# Patient Record
Sex: MALE | Race: OTHER | Employment: FULL TIME | URBAN - METROPOLITAN AREA
[De-identification: names, ages, dates, MRNs, and addresses within clinical notes are randomized per-mention and may not be internally consistent; named-entity substitution may affect disease eponyms.]

---

## 2018-01-22 ENCOUNTER — HOSPITAL ENCOUNTER (EMERGENCY)
Facility: HOSPITAL | Age: 53
Discharge: HOME | End: 2018-01-22
Attending: EMERGENCY MEDICINE
Payer: COMMERCIAL

## 2018-01-22 ENCOUNTER — APPOINTMENT (OUTPATIENT)
Dept: CT IMAGING | Facility: HOSPITAL | Age: 53
End: 2018-01-22
Attending: EMERGENCY MEDICINE
Payer: COMMERCIAL

## 2018-01-22 VITALS
TEMPERATURE: 98 F | SYSTOLIC BLOOD PRESSURE: 130 MMHG | HEART RATE: 73 BPM | BODY MASS INDEX: 29.12 KG/M2 | OXYGEN SATURATION: 98 % | WEIGHT: 215 LBS | RESPIRATION RATE: 18 BRPM | DIASTOLIC BLOOD PRESSURE: 77 MMHG | HEIGHT: 72 IN

## 2018-01-22 DIAGNOSIS — S06.0X0A CEREBRAL CONCUSSION, WITHOUT LOSS OF CONSCIOUSNESS, INITIAL ENCOUNTER: Primary | ICD-10-CM

## 2018-01-22 PROCEDURE — 99284 EMERGENCY DEPT VISIT MOD MDM: CPT

## 2018-01-22 PROCEDURE — 70450 CT HEAD/BRAIN W/O DYE: CPT | Performed by: EMERGENCY MEDICINE
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
## 2021-10-25 ENCOUNTER — HOSPITAL ENCOUNTER (INPATIENT)
Facility: HOSPITAL | Age: 56
LOS: 4 days | Discharge: HOME | DRG: 373 | End: 2021-10-29
Attending: EMERGENCY MEDICINE | Admitting: HOSPITALIST
Payer: COMMERCIAL

## 2021-10-25 ENCOUNTER — APPOINTMENT (OUTPATIENT)
Dept: CT IMAGING | Facility: HOSPITAL | Age: 56
DRG: 373 | End: 2021-10-25
Attending: EMERGENCY MEDICINE
Payer: COMMERCIAL

## 2021-10-25 DIAGNOSIS — K35.32 ACUTE APPENDICITIS WITH PERFORATION AND LOCALIZED PERITONITIS, WITHOUT ABSCESS, UNSPECIFIED WHETHER GANGRENE PRESENT: Primary | ICD-10-CM

## 2021-10-25 PROCEDURE — 74177 CT ABD & PELVIS W/CONTRAST: CPT | Performed by: EMERGENCY MEDICINE

## 2021-10-25 PROCEDURE — 99223 1ST HOSP IP/OBS HIGH 75: CPT | Performed by: INTERNAL MEDICINE

## 2021-10-25 RX ORDER — KETOROLAC TROMETHAMINE 15 MG/ML
15 INJECTION, SOLUTION INTRAMUSCULAR; INTRAVENOUS ONCE
Status: COMPLETED | OUTPATIENT
Start: 2021-10-25 | End: 2021-10-25

## 2021-10-25 RX ORDER — MORPHINE SULFATE 2 MG/ML
2 INJECTION, SOLUTION INTRAMUSCULAR; INTRAVENOUS EVERY 2 HOUR PRN
Status: DISCONTINUED | OUTPATIENT
Start: 2021-10-25 | End: 2021-10-26

## 2021-10-25 RX ORDER — ACETAMINOPHEN 325 MG/1
650 TABLET ORAL EVERY 6 HOURS PRN
Status: DISCONTINUED | OUTPATIENT
Start: 2021-10-25 | End: 2021-10-29

## 2021-10-25 RX ORDER — MORPHINE SULFATE 4 MG/ML
4 INJECTION, SOLUTION INTRAMUSCULAR; INTRAVENOUS EVERY 2 HOUR PRN
Status: DISCONTINUED | OUTPATIENT
Start: 2021-10-25 | End: 2021-10-26

## 2021-10-25 RX ORDER — SODIUM CHLORIDE 9 MG/ML
INJECTION, SOLUTION INTRAVENOUS CONTINUOUS
Status: DISCONTINUED | OUTPATIENT
Start: 2021-10-25 | End: 2021-10-29

## 2021-10-25 RX ORDER — MORPHINE SULFATE 2 MG/ML
1 INJECTION, SOLUTION INTRAMUSCULAR; INTRAVENOUS EVERY 2 HOUR PRN
Status: DISCONTINUED | OUTPATIENT
Start: 2021-10-25 | End: 2021-10-26

## 2021-10-25 RX ORDER — MORPHINE SULFATE 4 MG/ML
4 INJECTION, SOLUTION INTRAMUSCULAR; INTRAVENOUS ONCE
Status: COMPLETED | OUTPATIENT
Start: 2021-10-25 | End: 2021-10-25

## 2021-10-26 PROCEDURE — 99254 IP/OBS CNSLTJ NEW/EST MOD 60: CPT | Performed by: SURGERY

## 2021-10-26 PROCEDURE — 99233 SBSQ HOSP IP/OBS HIGH 50: CPT | Performed by: HOSPITALIST

## 2021-10-26 RX ORDER — ONDANSETRON 2 MG/ML
4 INJECTION INTRAMUSCULAR; INTRAVENOUS EVERY 6 HOURS PRN
Status: DISCONTINUED | OUTPATIENT
Start: 2021-10-26 | End: 2021-10-29

## 2021-10-26 RX ORDER — HYDROMORPHONE HYDROCHLORIDE 1 MG/ML
0.2 INJECTION, SOLUTION INTRAMUSCULAR; INTRAVENOUS; SUBCUTANEOUS EVERY 2 HOUR PRN
Status: DISCONTINUED | OUTPATIENT
Start: 2021-10-26 | End: 2021-10-29

## 2021-10-26 RX ORDER — HYDROMORPHONE HYDROCHLORIDE 1 MG/ML
0.4 INJECTION, SOLUTION INTRAMUSCULAR; INTRAVENOUS; SUBCUTANEOUS EVERY 2 HOUR PRN
Status: DISCONTINUED | OUTPATIENT
Start: 2021-10-26 | End: 2021-10-29

## 2021-10-26 RX ORDER — HYDROMORPHONE HYDROCHLORIDE 1 MG/ML
0.8 INJECTION, SOLUTION INTRAMUSCULAR; INTRAVENOUS; SUBCUTANEOUS EVERY 2 HOUR PRN
Status: DISCONTINUED | OUTPATIENT
Start: 2021-10-26 | End: 2021-10-29

## 2021-10-26 RX ORDER — ONDANSETRON 2 MG/ML
INJECTION INTRAMUSCULAR; INTRAVENOUS
Status: COMPLETED
Start: 2021-10-26 | End: 2021-10-26

## 2021-10-26 NOTE — PROGRESS NOTES
[facility] - [facility]  Progress Note     Amadou Gutierrez  : 1965    Status: Inpatient  Day #: 1    Attending: Danielle Cortes MD  PCP: No primary care provider on file.       Assessment and Plan     Acute appendicitis with perforation and localized perit appendicitis with rupture. There is a dilated appendix with moderate inflammatory reaction about the appendix and a mild-to-moderate amount of surrounding fluid. No large abscess or large free air collection noted. Surgical consultation is needed.   No

## 2021-10-26 NOTE — CONSULTS
[facility] [address] Patient Status:  Inpatient    1965 MRN [id number]   Location [facility] 4W/SW/SE Attending Colt Palmer MD   Hosp Day # 1 PCP No primary care provider on file.      Date:  10/26/2021  D No swelling noted. Integument: No lesions. No erythema. Psychiatric: Appropriate mood and affect.         Results:    Lab Results   Component Value Date    WBC 16.4 (H) 10/26/2021    HGB 13.0 10/26/2021    HCT 40.7 10/26/2021    .0 10/26/2021    CR Resident      Addendum:    Pt seen and examined. I agree with Dr. Petar Baker' note. Perforated appendicitis. Plan for non operative management. Reimaging in 2 days if pt has persistent leukocytosis.         Stefania Kinney MD

## 2021-10-26 NOTE — H&P
27 [facility] Patient Status:  Emergency    1965 MRN [id number]   Location [address] [place] [address] Attending Quoc Fontaine MD   Hosp Day # 0 PCP No primary care provider on file. pain  Genitourinary:negative  Hematologic/lymphatic: negative  Musculoskeletal:negative  Neurological: negative  Behavioral/Psych: negative    Physical Exam:   Vital Signs:  Blood pressure 129/74, pulse 67, temperature 98.1 °F (36.7 °C), temperature source Diverticulosis of the descending and more significantly involving the sigmoid colon without definite acute diverticulitis changes. 4. Results were called by Dr. Thierno Eagle to Dr. Willard Rucker in the emergency room at approximately 2155 hours on 10/25/2021. Guadalupe Riedel     Dict

## 2021-10-26 NOTE — ED QUICK NOTES
Orders for admission, patient is aware of plan and ready to go upstairs.  Any questions, please call ED GINA Carroll  at extension [phone]  Type of COVID test sent:  Rapid Pending    Titratable drug(s) infusing:  Rate:  Zosyn given    LOC at time of transport:

## 2021-10-26 NOTE — PAYOR COMM NOTE
--------------  ADMISSION REVIEW     Payor: [facility] PPO  Subscriber #:  [id number]  Authorization Number: [id number]    Admit date: 10/25/21  Admit time: 11:34 PM       REVIEW DOCUMENTATION:     ED Provider Notes      ED Provider Notes sign 98%   BMI 32.55 kg/m²         Physical Exam      General Appearance: alert, no distress  Eyes: pupils equal and round no pallor or injection  ENT, Mouth: mucous membranes moist  Respiratory: there are no retractions, lungs are clear to auscultation  Cardio DIFFERENTIAL[[id number]]          Abnormal            Final result                 Please view results for these tests on the individual orders.    RAINBOW DRAW LAVENDER   RAINBOW DRAW LIGHT GREEN   RAPID SARS-COV-2 BY PCR   ED/MRSA SCREEN BY PCR-CC Pain      HPI:   Aldair Don is a(n) 60year old male. Pleasant 43-year-old male with significant past medical history of diverticulosis. Not currently on any medications. Does not have a primary care provider.   Has not been to a physician in many years benign.   Pulmonary:  clear to auscultation bilaterally  Chest wall: no tenderness  Cardiovascular: S1, S2 normal, no murmur, click, rub or gallop, regular rate and rhythm  Abdominal: abnormal findings:   rebound tenderness and Pain RLQ  Extremities: extrem peritonitis, without abscess  - General surgery consulted  - Cont IV zosyn  - NPO  - 0.9 IVFs    diet: NPO  ivf: IVFs  dvt prophylaxis: Hold while awaiting surgery recs  antibiotics: Zosyn  tubes: none  central lines: none  code status: full code  dispo: h Action Dose Route User    10/25/2021 9428 New Bag 1,000 mL Intravenous Valerie Kaur, GINA          Vitals (last day)     Date/Time Temp Pulse Resp BP SpO2 Weight O2 Device O2 Flow Rate (L/min) [facility]    10/26/21 0321 98.6 °F (37 °C) 72 22 144/78 99 % — — —

## 2021-10-26 NOTE — PROGRESS NOTES
[facility] Pharmacy Note: Antimicrobial Weight Based Dose Adjustment for: piperacillin/tazobactam (Mila Campbell)    Keyla Pal is a 60year old patient who has been prescribed piperacillin/tazobactam (ZOSYN) 3.375 gm x1 in ER.     Estimated Creatinine Clearance: 102.8 m

## 2021-10-26 NOTE — ED INITIAL ASSESSMENT (HPI)
Pt states having abd pain, that is just like his diverticulitis. States constipated. Denies nausea/vomining/diarrhea.

## 2021-10-26 NOTE — ED PROVIDER NOTES
Patient Seen in: [facility] AND [facility] Emergency Department      History   Patient presents with:  Abdomen/Flank Pain    Stated Complaint: abdominal pain    Subjective:   HPI    24-year-old male with history of diverticulitis presents with complaints of abdo lower quadrants, no masses, bowel sounds normal  Neurological: Speech normal.  Moving extremities equally x4. Skin: warm and dry, no rashes.   Musculoskeletal: neck is supple non tender        Extremities are symmetrical, full range of motion  Psychiatric: Chasidy, general surgery.   Admission disposition: 10/25/2021 10:30 PM                                  Disposition and Plan     Clinical Impression:  Acute appendicitis with perforation and localized peritonitis, without abscess, unspecified whether gangrene p

## 2021-10-26 NOTE — PLAN OF CARE
Rosa Maria Ingram is A&Ox4. Pain managed with morphine. PRN zofran for nausea. IV Zosyn for abx. NPO. IVF initiated. Resting comfortable at this time.     Problem: Patient Centered Care  Goal: Patient preferences are identified and integrated in the patient's plan of c appropriate  Outcome: Progressing     Problem: RISK FOR INFECTION - ADULT  Goal: Absence of fever/infection during anticipated neutropenic period  Description: INTERVENTIONS  - Monitor WBC  - Administer growth factors as ordered  - Implement neutropenic gu

## 2021-10-27 PROCEDURE — 99233 SBSQ HOSP IP/OBS HIGH 50: CPT | Performed by: HOSPITALIST

## 2021-10-27 PROCEDURE — 99232 SBSQ HOSP IP/OBS MODERATE 35: CPT | Performed by: SURGERY

## 2021-10-27 RX ORDER — HYDROCODONE BITARTRATE AND ACETAMINOPHEN 5; 325 MG/1; MG/1
1 TABLET ORAL EVERY 6 HOURS PRN
Status: DISCONTINUED | OUTPATIENT
Start: 2021-10-27 | End: 2021-10-29

## 2021-10-27 RX ORDER — HYDROCODONE BITARTRATE AND ACETAMINOPHEN 5; 325 MG/1; MG/1
2 TABLET ORAL EVERY 6 HOURS PRN
Status: DISCONTINUED | OUTPATIENT
Start: 2021-10-27 | End: 2021-10-29

## 2021-10-27 NOTE — PROGRESS NOTES
[place] [facility]    Progress Note    Haley Huerta Patient Status:  Inpatient    1965 MRN [id number]   Location [facility] 4W/SW/SE Attending Shira Hankins MD   Hosp Day # 2 PCP No primary care provider on file.      Assessment & Plan: Date: 10/25/2021  CONCLUSION:  1. Findings are compatible with acute appendicitis with rupture. There is a dilated appendix with moderate inflammatory reaction about the appendix and a mild-to-moderate amount of surrounding fluid.   No large abscess or lar

## 2021-10-27 NOTE — PLAN OF CARE
Continue IV ABX , pt on clear liquid diet, medicated for pain with Norco with good results, pt up sitting in chair and walking in room.   Problem: Patient Centered Care  Goal: Patient preferences are identified and integrated in the patient's plan of care

## 2021-10-27 NOTE — PROGRESS NOTES
[place] [facility] - [facility]    Progress Note    Beatriz Cash Patient Status:  Inpatient    1965 MRN [id number]   Location [facility] 4W/SW/SE Attending Yu Barillas,  [facility] Day # 2 PCP No primary care provider on file.        Subjective: Finalized by (CST): Dane Johnson MD on 10/25/2021 at 10:06 PM                Assessment and Plan:     Acute appendicitis with perforation and localized peritonitis without abscess  Leukocytosis  - general surgery on consult  - CT A/P reviewed - no flui

## 2021-10-27 NOTE — PLAN OF CARE
Pt is A/Ox4, on room air, tolerating clear liquid diet. No BM, voiding freely. Reported nausea x1 that was managed with Zofran, pain throughout shift managed with Dilaudid PRN per [facility]. IVF and antibiotics infusing. Will continue to monitor.      Problem: Pa unsuccessful or patient reports new pain  - Anticipate increased pain with activity and pre-medicate as appropriate  Outcome: Progressing     Problem: RISK FOR INFECTION - ADULT  Goal: Absence of fever/infection during anticipated neutropenic period  Descr

## 2021-10-28 ENCOUNTER — APPOINTMENT (OUTPATIENT)
Dept: CT IMAGING | Facility: HOSPITAL | Age: 56
DRG: 373 | End: 2021-10-28
Attending: SURGERY
Payer: COMMERCIAL

## 2021-10-28 PROCEDURE — 74177 CT ABD & PELVIS W/CONTRAST: CPT | Performed by: SURGERY

## 2021-10-28 PROCEDURE — 99233 SBSQ HOSP IP/OBS HIGH 50: CPT | Performed by: HOSPITALIST

## 2021-10-28 NOTE — PROGRESS NOTES
[place] [facility]    Progress Note    [facility] Patient Status:  Inpatient    1965 MRN [id number]   Location [facility] 4W/SW/SE Attending Jeanine Pérez, [facility] Day # 3 PCP No primary care provider on file.        Subjective:

## 2021-10-28 NOTE — PROGRESS NOTES
[facility]    Progress Note    Davion Saeed Patient Status:  Inpatient    1965 MRN [id number]   Location [facility] 4W/SW/SE Attending Brenda Cano,  [facility] Day # 2 PCP No primary care provider on file.      Assessment and ALKPHO 89 10/26/2021    BILT 0.6 10/26/2021    TP 7.4 10/26/2021    AST 7 (L) 10/26/2021    ALT 18 10/26/2021    LIP 79 10/25/2021       No results found.             Freya Claudio MD  10/27/2021

## 2021-10-28 NOTE — PROGRESS NOTES
[place] [facility] - [facility]    Progress Note    Tildon Left Patient Status:  Inpatient    1965 MRN [id number]   Location [facility] 4W/SW/SE Attending Ava Gordon MD   Hosp Day # 3 PCP No primary care provider on file.      Assessment & Plan:

## 2021-10-28 NOTE — PLAN OF CARE
Problem: Patient Centered Care  Goal: Patient preferences are identified and integrated in the patient's plan of care  Description: Interventions:  - What would you like us to know as we care for you?  I live with my spouse  - Provide timely, complete, an period  Description: INTERVENTIONS  - Monitor WBC  - Administer growth factors as ordered  - Implement neutropenic guidelines  Outcome: Progressing     Problem: DISCHARGE PLANNING  Goal: Discharge to home or other facility with appropriate resources  Descr

## 2021-10-29 VITALS
OXYGEN SATURATION: 99 % | HEART RATE: 56 BPM | HEIGHT: 72 IN | RESPIRATION RATE: 18 BRPM | BODY MASS INDEX: 34.81 KG/M2 | TEMPERATURE: 98 F | SYSTOLIC BLOOD PRESSURE: 123 MMHG | WEIGHT: 257 LBS | DIASTOLIC BLOOD PRESSURE: 66 MMHG

## 2021-10-29 PROCEDURE — 99239 HOSP IP/OBS DSCHRG MGMT >30: CPT | Performed by: HOSPITALIST

## 2021-10-29 RX ORDER — METRONIDAZOLE 500 MG/1
500 TABLET ORAL 3 TIMES DAILY
Qty: 42 TABLET | Refills: 0 | Status: SHIPPED | OUTPATIENT
Start: 2021-10-29 | End: 2021-11-12

## 2021-10-29 RX ORDER — LEVOFLOXACIN 500 MG/1
500 TABLET, FILM COATED ORAL DAILY
Qty: 14 TABLET | Refills: 0 | Status: SHIPPED | OUTPATIENT
Start: 2021-10-29 | End: 2021-11-12

## 2021-10-29 RX ORDER — HYDROCODONE BITARTRATE AND ACETAMINOPHEN 5; 325 MG/1; MG/1
1-2 TABLET ORAL EVERY 6 HOURS PRN
Qty: 20 TABLET | Refills: 0 | Status: SHIPPED | OUTPATIENT
Start: 2021-10-29

## 2021-10-29 NOTE — PLAN OF CARE
Pt A&Ox4. VSS. States pain, PRN norco given. Denies nausea or vomiting. Burping and passing gas. IV fluids and abx continued. Advanced diet to general. Ambulating independently. Plan for possible discharge with PO abx. Family at bedside.  Safety precautions side effects  - Notify MD/LIP if interventions unsuccessful or patient reports new pain  - Anticipate increased pain with activity and pre-medicate as appropriate  Outcome: Progressing     Problem: RISK FOR INFECTION - ADULT  Goal: Absence of fever/infecti

## 2021-10-29 NOTE — PROGRESS NOTES
[facility]    Progress Note    Aldair Don Patient Status:  Inpatient    1965 MRN [id number]   Location [facility] 4W/SW/SE Attending Andrea Barroso MD   Hosp Day # 4 PCP No primary care provider on file.      Assessment & Plan: ONLY)(CPT=74177)    Result Date: 10/28/2021  CONCLUSION:  1.  When compared to the previous study, there has been progression of a moderate inflammatory reaction in the right lower quadrant about the inflamed appendix with increasing phlegmonous appearing r

## 2021-10-29 NOTE — PLAN OF CARE
Discharge orders received from medical and surgical. Discharge instructions printed and discussed with patient and wife. Follow up appointments discussed. Prescriptions sent to pharmacy. All questions answered. Patient discharged home with no needs.  Lynette analgesics based on type and severity of pain and evaluate response  - Implement non-pharmacological measures as appropriate and evaluate response  - Consider cultural and social influences on pain and pain management  - Manage/alleviate anxiety  - Utilize Allen Alvarez, RN  Outcome: Progressing

## 2021-10-29 NOTE — PROGRESS NOTES
[facility]    Progress Note    Bereket Gil Patient Status:  Inpatient    1965 MRN [id number]   Location [facility] 4W/SW/SE Attending Lawson Hinson,  [facility]  Day # 4 PCP No primary care provider on file.        Subjective: Vicarious excretion of contrast in the gallbladder. Small right pleural effusion and mild right basal atelectasis now noted.     Dictated by (CST): Tara Maria MD on 10/28/2021 at 9:59 PM     Finalized by (CST): Tara Maria MD on 10/28/2021 at 10:09

## 2021-10-30 NOTE — DISCHARGE SUMMARY
[facility]    Discharge Summary    Frutoso Sinks Patient Status:  Inpatient    1965 MRN [id number]   Location [facility] 4W/SW/SE Attending No att. providers found   [facility] Day # 4 PCP No primary care provider on file.      Date acute appendicitis with perforation and localized peritonitis, without abscess. Started on IV Zosyn. General surgery contacted. Patient was administered IV morphine with minimal relief. Rates the pain as a 10 out of 10.     Hospital Course:     Acute ap · HYDROcodone-acetaminophen 5-325 MG Tabs  · levoFLOXacin 500 MG Tabs  · metRONIDAZOLE 500 MG Tabs         Follow up Visits:      Follow-up Information     Mayra Hawkins MD.    Specialty: SURGERY, GENERAL  Why: As needed  Contact information:  [place]

## 2022-03-07 ENCOUNTER — APPOINTMENT (OUTPATIENT)
Dept: ULTRASOUND IMAGING | Facility: HOSPITAL | Age: 57
End: 2022-03-07
Attending: EMERGENCY MEDICINE
Payer: COMMERCIAL

## 2022-03-07 ENCOUNTER — HOSPITAL ENCOUNTER (EMERGENCY)
Facility: HOSPITAL | Age: 57
Discharge: HOME | End: 2022-03-07
Attending: EMERGENCY MEDICINE
Payer: COMMERCIAL

## 2022-03-07 ENCOUNTER — APPOINTMENT (OUTPATIENT)
Dept: CT IMAGING | Facility: HOSPITAL | Age: 57
End: 2022-03-07
Attending: EMERGENCY MEDICINE
Payer: COMMERCIAL

## 2022-03-07 VITALS
OXYGEN SATURATION: 99 % | DIASTOLIC BLOOD PRESSURE: 94 MMHG | HEIGHT: 72 IN | WEIGHT: 230 LBS | SYSTOLIC BLOOD PRESSURE: 168 MMHG | RESPIRATION RATE: 18 BRPM | TEMPERATURE: 97 F | HEART RATE: 74 BPM | BODY MASS INDEX: 31.15 KG/M2

## 2022-03-07 DIAGNOSIS — R10.9 RIGHT FLANK PAIN: Primary | ICD-10-CM

## 2022-03-07 LAB
ALBUMIN SERPL-MCNC: 3.5 G/DL (ref 3.4–5)
ALP LIVER SERPL-CCNC: 111 U/L
ALT SERPL-CCNC: 52 U/L
ANION GAP SERPL CALC-SCNC: 4 MMOL/L (ref 0–18)
AST SERPL-CCNC: 67 U/L (ref 15–37)
BASOPHILS # BLD AUTO: 0.09 X10(3) UL (ref 0–0.2)
BASOPHILS NFR BLD AUTO: 1 %
BILIRUB DIRECT SERPL-MCNC: 0.2 MG/DL (ref 0–0.2)
BILIRUB SERPL-MCNC: 0.5 MG/DL (ref 0.1–2)
BILIRUB UR QL: NEGATIVE
BUN BLD-MCNC: 17 MG/DL (ref 7–18)
BUN/CREAT SERPL: 16 (ref 10–20)
CALCIUM BLD-MCNC: 8.7 MG/DL (ref 8.5–10.1)
CHLORIDE SERPL-SCNC: 106 MMOL/L (ref 98–112)
CO2 SERPL-SCNC: 28 MMOL/L (ref 21–32)
COLOR UR: YELLOW
CREAT BLD-MCNC: 1.06 MG/DL
DEPRECATED RDW RBC AUTO: 41.4 FL (ref 35.1–46.3)
EOSINOPHIL # BLD AUTO: 0.23 X10(3) UL (ref 0–0.7)
EOSINOPHIL NFR BLD AUTO: 2.5 %
ERYTHROCYTE [DISTWIDTH] IN BLOOD BY AUTOMATED COUNT: 13.5 % (ref 11–15)
GLUCOSE BLD-MCNC: 120 MG/DL (ref 70–99)
GLUCOSE UR-MCNC: NEGATIVE MG/DL
HCT VFR BLD AUTO: 44.3 %
HGB BLD-MCNC: 14.2 G/DL
HGB UR QL STRIP.AUTO: NEGATIVE
IMM GRANULOCYTES # BLD AUTO: 0.04 X10(3) UL (ref 0–1)
IMM GRANULOCYTES NFR BLD: 0.4 %
KETONES UR-MCNC: NEGATIVE MG/DL
LEUKOCYTE ESTERASE UR QL STRIP.AUTO: NEGATIVE
LIPASE SERPL-CCNC: 146 U/L (ref 73–393)
LYMPHOCYTES # BLD AUTO: 2.73 X10(3) UL (ref 1–4)
LYMPHOCYTES NFR BLD AUTO: 29.8 %
MCHC RBC AUTO-ENTMCNC: 32.1 G/DL (ref 31–37)
MCV RBC AUTO: 83.4 FL
MONOCYTES # BLD AUTO: 0.6 X10(3) UL (ref 0.1–1)
MONOCYTES NFR BLD AUTO: 6.6 %
NEUTROPHILS # BLD AUTO: 5.47 X10 (3) UL (ref 1.5–7.7)
NEUTROPHILS # BLD AUTO: 5.47 X10(3) UL (ref 1.5–7.7)
NEUTROPHILS NFR BLD AUTO: 59.7 %
NITRITE UR QL STRIP.AUTO: NEGATIVE
OSMOLALITY SERPL CALC.SUM OF ELEC: 289 MOSM/KG (ref 275–295)
PH UR: 7 [PH] (ref 5–8)
PLATELET # BLD AUTO: 317 10(3)UL (ref 150–450)
POTASSIUM SERPL-SCNC: 3.9 MMOL/L (ref 3.5–5.1)
PROT SERPL-MCNC: 7.2 G/DL (ref 6.4–8.2)
PROT UR-MCNC: NEGATIVE MG/DL
RBC # BLD AUTO: 5.31 X10(6)UL
SODIUM SERPL-SCNC: 138 MMOL/L (ref 136–145)
SP GR UR STRIP: 1.02 (ref 1–1.03)
UROBILINOGEN UR STRIP-ACNC: <2
VIT C UR-MCNC: NEGATIVE MG/DL
WBC # BLD AUTO: 9.2 X10(3) UL (ref 4–11)

## 2022-03-07 PROCEDURE — 85025 COMPLETE CBC W/AUTO DIFF WBC: CPT | Performed by: EMERGENCY MEDICINE

## 2022-03-07 PROCEDURE — 74177 CT ABD & PELVIS W/CONTRAST: CPT | Performed by: EMERGENCY MEDICINE

## 2022-03-07 PROCEDURE — 96374 THER/PROPH/DIAG INJ IV PUSH: CPT

## 2022-03-07 PROCEDURE — 99284 EMERGENCY DEPT VISIT MOD MDM: CPT

## 2022-03-07 PROCEDURE — 81003 URINALYSIS AUTO W/O SCOPE: CPT | Performed by: EMERGENCY MEDICINE

## 2022-03-07 PROCEDURE — 80076 HEPATIC FUNCTION PANEL: CPT | Performed by: EMERGENCY MEDICINE

## 2022-03-07 PROCEDURE — 76705 ECHO EXAM OF ABDOMEN: CPT | Performed by: EMERGENCY MEDICINE

## 2022-03-07 PROCEDURE — 80048 BASIC METABOLIC PNL TOTAL CA: CPT | Performed by: EMERGENCY MEDICINE

## 2022-03-07 PROCEDURE — 96375 TX/PRO/DX INJ NEW DRUG ADDON: CPT

## 2022-03-07 PROCEDURE — 83690 ASSAY OF LIPASE: CPT | Performed by: EMERGENCY MEDICINE

## 2022-03-07 RX ORDER — ONDANSETRON 2 MG/ML
INJECTION INTRAMUSCULAR; INTRAVENOUS
Status: COMPLETED
Start: 2022-03-07 | End: 2022-03-07

## 2022-03-07 RX ORDER — MORPHINE SULFATE 4 MG/ML
4 INJECTION, SOLUTION INTRAMUSCULAR; INTRAVENOUS ONCE
Status: COMPLETED | OUTPATIENT
Start: 2022-03-07 | End: 2022-03-07

## 2022-03-07 RX ORDER — ONDANSETRON 2 MG/ML
4 INJECTION INTRAMUSCULAR; INTRAVENOUS ONCE
Status: COMPLETED | OUTPATIENT
Start: 2022-03-07 | End: 2022-03-07

## 2022-03-07 NOTE — ED INITIAL ASSESSMENT (HPI)
Patient arrives with RUQ pain that woke him up from his sleep. Denies N/V/D, fevers, and constipation.

## 2023-08-24 ENCOUNTER — OFFICE VISIT (OUTPATIENT)
Dept: INTERNAL MEDICINE CLINIC | Facility: CLINIC | Age: 58
End: 2023-08-24

## 2023-08-24 ENCOUNTER — LAB ENCOUNTER (OUTPATIENT)
Dept: LAB | Age: 58
End: 2023-08-24
Attending: INTERNAL MEDICINE
Payer: COMMERCIAL

## 2023-08-24 VITALS
SYSTOLIC BLOOD PRESSURE: 138 MMHG | TEMPERATURE: 98 F | DIASTOLIC BLOOD PRESSURE: 76 MMHG | OXYGEN SATURATION: 97 % | HEIGHT: 72 IN | BODY MASS INDEX: 32.42 KG/M2 | WEIGHT: 239.38 LBS | HEART RATE: 68 BPM

## 2023-08-24 DIAGNOSIS — K57.90 DIVERTICULOSIS: ICD-10-CM

## 2023-08-24 DIAGNOSIS — Z00.00 PHYSICAL EXAM: Primary | ICD-10-CM

## 2023-08-24 DIAGNOSIS — M17.0 PRIMARY OSTEOARTHRITIS OF BOTH KNEES: ICD-10-CM

## 2023-08-24 DIAGNOSIS — Z00.00 PHYSICAL EXAM: ICD-10-CM

## 2023-08-24 LAB
ALBUMIN SERPL-MCNC: 3.6 G/DL (ref 3.4–5)
ALBUMIN/GLOB SERPL: 1 {RATIO} (ref 1–2)
ALP LIVER SERPL-CCNC: 74 U/L
ALT SERPL-CCNC: 28 U/L
ANION GAP SERPL CALC-SCNC: 2 MMOL/L (ref 0–18)
AST SERPL-CCNC: 12 U/L (ref 15–37)
BASOPHILS # BLD AUTO: 0.09 X10(3) UL (ref 0–0.2)
BASOPHILS NFR BLD AUTO: 1.1 %
BILIRUB SERPL-MCNC: 0.4 MG/DL (ref 0.1–2)
BUN BLD-MCNC: 23 MG/DL (ref 7–18)
BUN/CREAT SERPL: 23 (ref 10–20)
CALCIUM BLD-MCNC: 8.7 MG/DL (ref 8.5–10.1)
CHLORIDE SERPL-SCNC: 110 MMOL/L (ref 98–112)
CHOLEST SERPL-MCNC: 193 MG/DL (ref ?–200)
CO2 SERPL-SCNC: 31 MMOL/L (ref 21–32)
COMPLEXED PSA SERPL-MCNC: 2.5 NG/ML (ref ?–4)
CREAT BLD-MCNC: 1 MG/DL
DEPRECATED RDW RBC AUTO: 39.9 FL (ref 35.1–46.3)
EGFRCR SERPLBLD CKD-EPI 2021: 86 ML/MIN/1.73M2 (ref 60–?)
EOSINOPHIL # BLD AUTO: 0.2 X10(3) UL (ref 0–0.7)
EOSINOPHIL NFR BLD AUTO: 2.5 %
ERYTHROCYTE [DISTWIDTH] IN BLOOD BY AUTOMATED COUNT: 12.9 % (ref 11–15)
EST. AVERAGE GLUCOSE BLD GHB EST-MCNC: 120 MG/DL (ref 68–126)
FASTING PATIENT LIPID ANSWER: NO
FASTING STATUS PATIENT QL REPORTED: NO
GLOBULIN PLAS-MCNC: 3.5 G/DL (ref 2.8–4.4)
GLUCOSE BLD-MCNC: 102 MG/DL (ref 70–99)
HBA1C MFR BLD: 5.8 % (ref ?–5.7)
HCT VFR BLD AUTO: 43.8 %
HDLC SERPL-MCNC: 48 MG/DL (ref 40–59)
HGB BLD-MCNC: 14.3 G/DL
IMM GRANULOCYTES # BLD AUTO: 0.02 X10(3) UL (ref 0–1)
IMM GRANULOCYTES NFR BLD: 0.2 %
LDLC SERPL CALC-MCNC: 125 MG/DL (ref ?–100)
LYMPHOCYTES # BLD AUTO: 3.38 X10(3) UL (ref 1–4)
LYMPHOCYTES NFR BLD AUTO: 42 %
MCH RBC QN AUTO: 27.7 PG (ref 26–34)
MCHC RBC AUTO-ENTMCNC: 32.6 G/DL (ref 31–37)
MCV RBC AUTO: 84.7 FL
MONOCYTES # BLD AUTO: 0.68 X10(3) UL (ref 0.1–1)
MONOCYTES NFR BLD AUTO: 8.5 %
NEUTROPHILS # BLD AUTO: 3.67 X10 (3) UL (ref 1.5–7.7)
NEUTROPHILS # BLD AUTO: 3.67 X10(3) UL (ref 1.5–7.7)
NEUTROPHILS NFR BLD AUTO: 45.7 %
NONHDLC SERPL-MCNC: 145 MG/DL (ref ?–130)
OSMOLALITY SERPL CALC.SUM OF ELEC: 300 MOSM/KG (ref 275–295)
PLATELET # BLD AUTO: 309 10(3)UL (ref 150–450)
POTASSIUM SERPL-SCNC: 4.1 MMOL/L (ref 3.5–5.1)
PROT SERPL-MCNC: 7.1 G/DL (ref 6.4–8.2)
RBC # BLD AUTO: 5.17 X10(6)UL
SODIUM SERPL-SCNC: 143 MMOL/L (ref 136–145)
TRIGL SERPL-MCNC: 111 MG/DL (ref 30–149)
TSI SER-ACNC: 2.75 MIU/ML (ref 0.36–3.74)
VLDLC SERPL CALC-MCNC: 20 MG/DL (ref 0–30)
WBC # BLD AUTO: 8 X10(3) UL (ref 4–11)

## 2023-08-24 PROCEDURE — 80053 COMPREHEN METABOLIC PANEL: CPT

## 2023-08-24 PROCEDURE — 85025 COMPLETE CBC W/AUTO DIFF WBC: CPT

## 2023-08-24 PROCEDURE — 3008F BODY MASS INDEX DOCD: CPT | Performed by: INTERNAL MEDICINE

## 2023-08-24 PROCEDURE — 3075F SYST BP GE 130 - 139MM HG: CPT | Performed by: INTERNAL MEDICINE

## 2023-08-24 PROCEDURE — 80061 LIPID PANEL: CPT

## 2023-08-24 PROCEDURE — 83036 HEMOGLOBIN GLYCOSYLATED A1C: CPT

## 2023-08-24 PROCEDURE — 84443 ASSAY THYROID STIM HORMONE: CPT

## 2023-08-24 PROCEDURE — 99396 PREV VISIT EST AGE 40-64: CPT | Performed by: INTERNAL MEDICINE

## 2023-08-24 PROCEDURE — 3078F DIAST BP <80 MM HG: CPT | Performed by: INTERNAL MEDICINE

## 2023-08-24 PROCEDURE — 36415 COLL VENOUS BLD VENIPUNCTURE: CPT

## 2023-08-29 ENCOUNTER — TELEPHONE (OUTPATIENT)
Dept: INTERNAL MEDICINE CLINIC | Facility: CLINIC | Age: 58
End: 2023-08-29

## 2024-01-12 ENCOUNTER — TELEPHONE (OUTPATIENT)
Dept: GASTROENTEROLOGY | Facility: CLINIC | Age: 59
End: 2024-01-12

## 2024-01-12 ENCOUNTER — OFFICE VISIT (OUTPATIENT)
Dept: GASTROENTEROLOGY | Facility: CLINIC | Age: 59
End: 2024-01-12

## 2024-01-12 VITALS
DIASTOLIC BLOOD PRESSURE: 88 MMHG | WEIGHT: 249 LBS | HEIGHT: 72 IN | HEART RATE: 60 BPM | BODY MASS INDEX: 33.72 KG/M2 | SYSTOLIC BLOOD PRESSURE: 151 MMHG

## 2024-01-12 DIAGNOSIS — Z12.11 ENCOUNTER FOR SCREENING COLONOSCOPY: ICD-10-CM

## 2024-01-12 DIAGNOSIS — Z87.19 HISTORY OF COLONIC DIVERTICULITIS: Primary | ICD-10-CM

## 2024-01-12 DIAGNOSIS — R93.3 ABNORMAL CT SCAN, COLON: ICD-10-CM

## 2024-01-12 PROCEDURE — 3077F SYST BP >= 140 MM HG: CPT | Performed by: INTERNAL MEDICINE

## 2024-01-12 PROCEDURE — 99242 OFF/OP CONSLTJ NEW/EST SF 20: CPT | Performed by: INTERNAL MEDICINE

## 2024-01-12 PROCEDURE — 3079F DIAST BP 80-89 MM HG: CPT | Performed by: INTERNAL MEDICINE

## 2024-01-12 PROCEDURE — 3008F BODY MASS INDEX DOCD: CPT | Performed by: INTERNAL MEDICINE

## 2024-01-12 RX ORDER — POLYETHYLENE GLYCOL 3350, SODIUM SULFATE ANHYDROUS, SODIUM BICARBONATE, SODIUM CHLORIDE, POTASSIUM CHLORIDE 236; 22.74; 6.74; 5.86; 2.97 G/4L; G/4L; G/4L; G/4L; G/4L
4 POWDER, FOR SOLUTION ORAL ONCE
Qty: 1 EACH | Refills: 0 | Status: SHIPPED | OUTPATIENT
Start: 2024-01-12 | End: 2024-01-12

## 2024-01-12 RX ORDER — POLYETHYLENE GLYCOL 3350, SODIUM SULFATE ANHYDROUS, SODIUM BICARBONATE, SODIUM CHLORIDE, POTASSIUM CHLORIDE 236; 22.74; 6.74; 5.86; 2.97 G/4L; G/4L; G/4L; G/4L; G/4L
4 POWDER, FOR SOLUTION ORAL ONCE
Qty: 1 EACH | Refills: 0 | Status: SHIPPED
Start: 2024-01-12 | End: 2024-01-12

## 2024-01-12 NOTE — PATIENT INSTRUCTIONS
GI schedulers -    Please schedule colonoscopy exam at [facility] ([facility])    BMI Readings from Last 1 Encounters:   01/12/24 33.77 kg/m²     MAC anesthesia     BP Readings from Last 5 Encounters:   01/12/24 151/88   08/24/23 138/76   03/07/22 (!) 168/94   10/29/21 123/66   01/22/18 130/77     Golytely (PEG) 4L bowel prep  Rx sent in to [facility]      DX = Screening colonoscopy examination    Medication instructions:    None

## 2024-01-12 NOTE — TELEPHONE ENCOUNTER
Unable to send 4L PEG bowel prep prescription from office encounter today; sent again from this telephone encounter.

## 2024-01-12 NOTE — PROGRESS NOTES
HPI:    Patient ID: Alexei Lange is a 58 year old gentleman with elevated BMI 33.8, previous history of acute sigmoid colon diverticulitis 2015 ([facility]) and severe perforated appendicitis October 2021, both managed conservatively with antibiotics.  No previous colonoscopy examination.    Mr. Lange previously saw Dr. Balaji Royal, recently Dr. Shady Gonzalez.  He is referred to us to discuss his first screening colonoscopy examination.    On review of systems, Mr. Lange denies GERD symptoms, dyspepsia, abdominal pain, blood in the stools.  Sounds and he made a good recovery from the episode of appendicitis 8 years ago.    Never smoker.    No family history colorectal cancer.    Recent labs 8/24/2023:  Normal CBC, hemoglobin 14.3g normocytic  AST 12 ALT 28 alk phosphatase 74  Serum albumin 3.6  Total cholesterol 193, triglycerides 111  TSH 2.75    Wt Readings from Last 20 Encounters:   01/12/24 249 lb (112.9 kg)   08/24/23 239 lb 6.4 oz (108.6 kg)   03/07/22 230 lb (104.3 kg)   10/25/21 257 lb (116.6 kg)   01/22/18 215 lb (97.5 kg)         Previous EGD examination: n  Previous colonoscopy(ies): n    HPI    Review of Systems    ======================  [facility]    CT ABDOMEN & PELVIS WITH CONTRAST (CTAP1)   03/16/2015 3:16 PM CDT    Imaging Results - CT ABDOMEN & PELVIS WITH CONTRAST (CTAP1) (03/16/2015 3:16 PM CDT)    GDT CLINICAL HISTORY: pt with cc of llq abd pain    COMPARISON: none    TECHNIQUE: 5 mm images were taken through the abdomen and pelvis  following the administration of nonionic intravenous contrast  material. Oral contrast was administered. Coronal and sagittal  reformatted images were generated.    FINDINGS:    The visualized lung bases are clear. There is no pleural or  pericardial effusion.    There is a 2.4 cm cyst in the left lobe of the liver. Otherwise, the  liver is normal in attenuation and enhancement. The gallbladder is  unremarkable. No intra or  extrahepatic biliary ductal dilatation is  seen. The spleen, pancreas and adrenal glands are unremarkable.    The kidneys show symmetric size and enhancement, with no  hydronephrosis or hydroureter. The urinary bladder is unremarkable.  The prostate gland is unremarkable.    There is a focal segment of circumferential wall thickening in the  sigmoid colon, with adjacent fat stranding and fluid. There are  numerous diverticula in this region. Findings are most compatible  with acute diverticulitis. There is no evidence for perforation.  There is no focal fluid collection or abscess. There are additional  diverticula seen throughout the colon. There are no dilated bowel  loops. No free intraperitoneal gas is seen. The appendix is normal in  caliber.    Visualized vasculature is unremarkable. No significant  lymphadenopathy is seen. There are mild degenerative changes noted in  the thoracic and lumbar spine.    IMPRESSION:    1. Findings consistent with acute diverticulitis of the sigmoid colon.    2. Simple cyst in the left lobe of the liver (2.4 cm).           Imaging Results - CT ABDOMEN & PELVIS WITH CONTRAST (CTAP1) (03/16/2015 3:16 PM CDT)  Specimen (Source) Anatomical Location / Laterality Collection Method / Volume Collection Time     Received Time  03/16/2015 3:16 PM CDT        ======================    10/25/2021: CT ABDOMEN + PELVIS (CONTRAST ONLY) (CPT=74177)     COMPARISON: None.     INDICATIONS: abdominal pain.     TECHNIQUE: CT images of the abdomen and pelvis were obtained with non-ionic intravenous contrast material.         FINDINGS:  LIVER: Probable cyst in the left lobe of the liver measuring 2.0 x 1.6 cm and a smaller adjacent cyst or hamartoma.  Normal liver size.  GALLBLADDER: Normal size and appearance.  BILIARY: No visible dilatation or calcification.    SPLEEN: No enlargement or focal lesion.    STOMACH: No gross gastric mass, obstruction or focal abnormality.  Duodenum  unremarkable.  PANCREAS: No lesion, fluid collection, ductal dilatation, or atrophy.    ADRENALS: No defined mass or abnormal enlargement.    KIDNEYS: Normal.  No mass, obstruction or calcification.    AORTA/VASCULAR:   Normal.  No aneurysm or dissection.  RETROPERITONEUM: No mass or enlarged adenopathy.    BOWEL/MESENTERY: Abnormal dilated appendix measuring up to 1.8 cm in transverse dimension which is partially fluid-filled, and there is a large amount of inflammation and fluid around the dilated appendix compatible with acute appendicitis with rupture.   Moderate fluid in the right lower quadrant and adjacent to the cecum.  No large abscess or free air is noted.  Correlate clinically and with surgical consultation is advised.  No definite bowel obstruction noted.  There is diverticulosis of the descending and more significantly involving the sigmoid colon.  There is poor distention of the lumen of the sigmoid colon common findings are more likely related to chronic diverticulosis of previous episodes of diverticulitis without well-defined acute inflammation.  Mild amount of free fluid in the pelvis more likely from the acute appendicitis.  ABDOMINAL WALL: Normal.  No mass or hernia.  URINARY BLADDER: No visible focal wall thickening, lesion or calculus.    PELVIC NODES: No enlarged mass or adenopathy.    PELVIC ORGANS: No visible mass.  Pelvic organs appropriate for patient age.  Mild amount of free pelvic fluid noted.    BONES:   No significant bony lesion or fracture.  LUNG BASES: No visible pulmonary or pleural disease.      CONCLUSION:  1. Findings are compatible with acute appendicitis with rupture.  There is a dilated appendix with moderate inflammatory reaction about the appendix and a mild-to-moderate amount of surrounding fluid.  No large abscess or large free air collection noted.  Surgical consultation is needed.  No bowel obstruction.  2. Probable cysts in the left lobe of the liver.  No kidney stone  or hydronephrosis.  3. Diverticulosis of the descending and more significantly involving the sigmoid colon without definite acute diverticulitis changes.  4. Results were called by Dr. Blank to Dr. Lyon in the emergency room at approximately 2155 hours on 10/25/2021..           Dictated by (CST): Ritchie Blank MD on 10/25/2021 at 9:58 PM    ======    3/07/2022: US GALLBLADDER (CPT=76705)     COMPARISON: [facility], CT ABDOMEN + PELVIS (CONTRAST ONLY) (CPT=74177), 3/07/2022, 6:18 AM.     INDICATIONS: R flank pain     TECHNIQUE:   The gallbladder was evaluated with grayscale ultrasound.       FINDINGS:  GALLBLADDER:   Normal size.  No calculi, wall thickening or pericholecystic fluid.  Patient is tender over the gallbladder.  BILE DUCTS:   Normal.  Common bile duct measures 4 mm.    OTHER:   2 cm cyst projects in the left lobe of the liver.        CONCLUSION:  1. Patient is tender over the gallbladder.  Otherwise, the gallbladder appears normal.  2. Simple liver cyst.  3. Findings were described by [facility].         Dictated by (CST): Ana Maria Gresham MD on 3/07/2022 at 6:33 AM     ======    3/07/2022: CT ABDOMEN + PELVIS (CONTRAST ONLY) (CPT=74177)     COMPARISON: [facility], CT ABDOMEN + PELVIS (CONTRAST ONLY) (CPT=74177), 10/28/2021, 4:58 PM.     INDICATIONS: R flank pain     TECHNIQUE: CT images of the abdomen and pelvis were obtained with non-ionic intravenous contrast material.       FINDINGS:  LIVER: 2.3 cm cyst in the left lobe.  GALLBLADDER: Mildly distended gallbladder.  No additional abnormalities.  No significant change since previous CT.  BILIARY: No visible dilatation or calcification.    SPLEEN: No enlargement or focal lesion.    STOMACH: No gross gastric mass, obstruction or focal abnormality.  Duodenum unremarkable.  PANCREAS: No lesion, fluid collection, ductal dilatation, or atrophy.    ADRENALS: No defined mass or abnormal enlargement.    KIDNEYS:  Tiny subcentimeter low-attenuation area in the upper pole of left kidney, unchanged since previous study, consistent with benign process.  The kidneys otherwise appear normal.  AORTA/VASCULAR:   Mild calcific atherosclerosis.  No aneurysm or dissection.  RETROPERITONEUM: No mass or enlarged adenopathy.    BOWEL/MESENTERY: Colonic diverticulosis without evidence of diverticulitis.  No visible mass, obstruction, or bowel wall thickening.  Appendix normal.  ABDOMINAL WALL: Tiny umbilical hernia containing fat.  Small left inguinal hernia containing fat.  URINARY BLADDER: No visible focal wall thickening, lesion or calculus.    PELVIC NODES: No enlarged mass or adenopathy.    PELVIC ORGANS: No visible mass.  Pelvic organs appropriate for patient age.    BONES:   No significant bony lesion or fracture.  LUNG BASES: No visible pulmonary or pleural disease.      CONCLUSION:  1. No acute findings.         Dictated by (CST): Ana Maria Gresham MD on 3/07/2022 at 6:40 AM               Current Outpatient Medications   Medication Sig Dispense Refill    HYDROcodone-acetaminophen 5-325 MG Oral Tab Take 1-2 tablets by mouth every 6 (six) hours as needed. (Patient not taking: Reported on 8/24/2023) 20 tablet 0         Allergies:No Known Allergies  Imaging: No results found.       PHYSICAL EXAM:   Physical Exam           ASSESSMENT/PLAN:     58 year old gentleman with elevated BMI 33.8, previous history of acute sigmoid colon diverticulitis 2015 ([facility]) and severe perforated appendicitis October 2021, both managed conservatively with antibiotics.  No previous colonoscopy examination.    Mr. Lange previously saw Dr. Balaji Royal, recently Dr. Shady Gonzalez.  He is referred to us to discuss his first screening colonoscopy examination.    On review of systems, Mr. Lange denies GERD symptoms, dyspepsia, abdominal pain, blood in the stools.  Sounds and he made a good recovery from the episode of  appendicitis 8 years ago.    Never smoker.    No family history colorectal cancer.    Recent labs 8/24/2023:  Normal CBC, hemoglobin 14.3g normocytic  AST 12 ALT 28 alk phosphatase 74  Serum albumin 3.6  Total cholesterol 193, triglycerides 111  TSH 2.75    Suggest:    Personal history colonic diverticulosis complicated by hospitalization for acute sigmoid colon diverticulitis 2015  No previous colonoscopy examination  Appears to have done well past 8 years until the recent acute appendicitis episode  Daily bowel regimen briefly discussed  Call as needed if symptoms recur; outpatient management of acute colonic diverticulitis briefly discussed  Schedule colonoscopy examination    2.  Colon cancer screening, average risk  Due for first screening colonoscopy examination    I recommended and discussed screening colonoscopy examination.  Alternatives including stool testing, imaging briefly discussed.    Consent:    I recommended colonoscopy examination with possible biopsy, possible polypectomy under MAC anesthesia . We discussed the nature and risks of colonoscopy examination including sedation, anesthesia risks; bleeding, colonic injury or perforation, infection.  We discussed the rare occurrence of missed lesions including missed polyps or missed malignancy with colonoscopy examination.  The patient understood these risks and agrees to proceed.  The need for an accompanying adult to provide a ride home or escort home was also discussed.    GoLYTELY bowel prep          Over 25 minutes spent today discussing the above and counseling this patient, reviewing chart notes and data and composing this note.     Digital transcription software was utilized to produce this note. The note was proofread for content only. Typographical errors may remain.       Meds This Visit:  Requested Prescriptions      No prescriptions requested or ordered in this encounter       Imaging & Referrals:  None       ID#[id number]

## 2024-01-16 ENCOUNTER — TELEPHONE (OUTPATIENT)
Facility: CLINIC | Age: 59
End: 2024-01-16

## 2024-01-16 DIAGNOSIS — Z12.11 SCREENING FOR COLON CANCER: Primary | ICD-10-CM

## 2024-01-16 NOTE — TELEPHONE ENCOUNTER
Scheduled for:  Colonoscopy 76675  Provider Name:    Date:  5/2/2024  Location:  [facility]  Sedation:  MAC  Time:  2:00 pm, (pt is aware that [facility] will call the day before to confirm arrival time)  Prep:  Golytely  Meds/Allergies Reconciled?:  Physician reviewed  Diagnosis with codes: Screening for Colon Cancer Z12.11   Was patient informed to call insurance with codes (Y/N):  Yes   Referral sent?:  Referral was sent at the time of electronic surgical scheduling.  EM or [facility] notified?:   Electronic case request was sent to [facility] via [employer].  Medication Orders:  N/A  Misc Orders:  N/A     Further instructions given by staff: I discussed the prep instructions with the patient which he verbally understood. Provided patient with prep instructions and cancellation policy at the time of office visit.

## 2024-04-30 ENCOUNTER — TELEPHONE (OUTPATIENT)
Facility: CLINIC | Age: 59
End: 2024-04-30

## 2024-08-30 ENCOUNTER — OFFICE VISIT (OUTPATIENT)
Dept: INTERNAL MEDICINE CLINIC | Facility: CLINIC | Age: 59
End: 2024-08-30

## 2024-08-30 VITALS
SYSTOLIC BLOOD PRESSURE: 137 MMHG | HEIGHT: 72 IN | HEART RATE: 75 BPM | OXYGEN SATURATION: 96 % | WEIGHT: 254 LBS | BODY MASS INDEX: 34.4 KG/M2 | TEMPERATURE: 98 F | DIASTOLIC BLOOD PRESSURE: 72 MMHG

## 2024-08-30 DIAGNOSIS — B02.29 POSTHERPETIC NEURALGIA: Primary | ICD-10-CM

## 2024-08-30 PROCEDURE — 3075F SYST BP GE 130 - 139MM HG: CPT | Performed by: INTERNAL MEDICINE

## 2024-08-30 PROCEDURE — 3078F DIAST BP <80 MM HG: CPT | Performed by: INTERNAL MEDICINE

## 2024-08-30 PROCEDURE — 99214 OFFICE O/P EST MOD 30 MIN: CPT | Performed by: INTERNAL MEDICINE

## 2024-08-30 PROCEDURE — 3008F BODY MASS INDEX DOCD: CPT | Performed by: INTERNAL MEDICINE

## 2024-08-30 RX ORDER — GABAPENTIN 600 MG/1
600 TABLET ORAL 2 TIMES DAILY
COMMUNITY
Start: 2024-07-26

## 2024-08-30 RX ORDER — SIMVASTATIN 40 MG
40 TABLET ORAL NIGHTLY
COMMUNITY
Start: 2024-07-22

## 2024-08-30 RX ORDER — VALACYCLOVIR HYDROCHLORIDE 1 G/1
1000 TABLET, FILM COATED ORAL 3 TIMES DAILY
COMMUNITY
Start: 2024-05-28

## 2024-08-30 NOTE — PROGRESS NOTES
Subjective:     Patient ID: Alexei Lange is a 59 year old male.    Shingles        History/Other: He came in today as a new patient.  According to the patient 3 months ago he was diagnosed with shingles at that time was treated valacyclovir since then he continues to have constant pain from left side of the chest under the breast all the way to the back.  It is severe.  He is taking gabapentin 600 mg twice a day and it is not helping.  Review of Systems   Constitutional: Negative.    HENT: Negative.     Eyes: Negative.    Respiratory: Negative.     Gastrointestinal: Negative.    Endocrine: Negative.    Genitourinary: Negative.    Musculoskeletal: Negative.    Skin: Negative.    Allergic/Immunologic: Negative.    Neurological: Negative.    Psychiatric/Behavioral: Negative.       Current Outpatient Medications   Medication Sig Dispense Refill    gabapentin 600 MG Oral Tab Take 1 tablet (600 mg total) by mouth 2 (two) times daily.      simvastatin 40 MG Oral Tab Take 1 tablet (40 mg total) by mouth nightly.      valACYclovir 1 G Oral Tab Take 1 tablet (1,000 mg total) by mouth 3 (three) times daily.      HYDROcodone-acetaminophen 5-325 MG Oral Tab Take 1-2 tablets by mouth every 6 (six) hours as needed. (Patient not taking: Reported on 8/24/2023) 20 tablet 0     Allergies:No Known Allergies    Past Medical History:    Diverticulitis    Hyperlipidemia    Osteoarthritis    knee      Past Surgical History:   Procedure Laterality Date    Rotator cuff repair Bilateral     Tonsillectomy        Family History   Problem Relation Age of Onset    Heart Disorder Mother     Stroke Mother     Heart Disorder Father     Stroke Father     Other (dm 2) Father       Social History:   Social History     Socioeconomic History    Marital status:    Tobacco Use    Smoking status: Never     Passive exposure: Never    Smokeless tobacco: Never   Vaping Use    Vaping status: Never Used   Substance and Sexual Activity    Alcohol use: Yes      Comment: occassionally    Drug use: Never        Objective:   Physical Exam  Vitals and nursing note reviewed.   Constitutional:       Appearance: Normal appearance.   HENT:      Head: Normocephalic and atraumatic.   Cardiovascular:      Rate and Rhythm: Normal rate and regular rhythm.      Pulses: Normal pulses.      Heart sounds: Normal heart sounds.   Pulmonary:      Effort: Pulmonary effort is normal.      Breath sounds: Normal breath sounds.   Abdominal:      Palpations: Abdomen is soft.   Musculoskeletal:         General: Normal range of motion.      Cervical back: Normal range of motion and neck supple.   Skin:     General: Skin is warm.   Neurological:      Mental Status: He is alert. Mental status is at baseline.   Psychiatric:         Mood and Affect: Mood normal.         Assessment & Plan:   1. Postherpetic neuralgia    Increase gabapentin to 600 mg 3 times a day if that does not work we will change to Lyrica I will also refer him to see pain management    No orders of the defined types were placed in this encounter.      Meds This Visit:  Requested Prescriptions      No prescriptions requested or ordered in this encounter       Imaging & Referrals:  OP REFERRAL PAIN MANGEMENT

## 2024-09-09 ENCOUNTER — OFFICE VISIT (OUTPATIENT)
Dept: PAIN CLINIC | Facility: HOSPITAL | Age: 59
End: 2024-09-09
Attending: STUDENT IN AN ORGANIZED HEALTH CARE EDUCATION/TRAINING PROGRAM
Payer: COMMERCIAL

## 2024-09-09 ENCOUNTER — TELEPHONE (OUTPATIENT)
Dept: PAIN CLINIC | Facility: HOSPITAL | Age: 59
End: 2024-09-09

## 2024-09-09 VITALS
SYSTOLIC BLOOD PRESSURE: 145 MMHG | HEART RATE: 77 BPM | OXYGEN SATURATION: 98 % | DIASTOLIC BLOOD PRESSURE: 82 MMHG | BODY MASS INDEX: 34 KG/M2 | WEIGHT: 250 LBS

## 2024-09-09 DIAGNOSIS — B02.29 HZV (HERPES ZOSTER VIRUS) POST HERPETIC NEURALGIA: Primary | ICD-10-CM

## 2024-09-09 PROCEDURE — 99202 OFFICE O/P NEW SF 15 MIN: CPT

## 2024-09-09 RX ORDER — LIDOCAINE 50 MG/G
2 PATCH TOPICAL EVERY 24 HOURS
Qty: 60 PATCH | Refills: 0 | Status: SHIPPED | OUTPATIENT
Start: 2024-09-09 | End: 2024-10-09

## 2024-09-09 RX ORDER — PREGABALIN 50 MG/1
50 CAPSULE ORAL 3 TIMES DAILY
Qty: 90 CAPSULE | Refills: 0 | Status: SHIPPED | OUTPATIENT
Start: 2024-09-09 | End: 2024-10-09

## 2024-09-09 RX ORDER — DULOXETIN HYDROCHLORIDE 30 MG/1
30 CAPSULE, DELAYED RELEASE ORAL DAILY
Qty: 30 CAPSULE | Refills: 0 | Status: SHIPPED | OUTPATIENT
Start: 2024-09-09

## 2024-09-09 NOTE — CHRONIC PAIN
Interventional Pain Medicine  New Patient Note    Subjective:     Referring Physician: No referring provider defined for this encounter.     Record Review: I personally reviewed IM records    Chief Complaint: New Patient (New patient-Postherpetic neuralgia-Maria A Chan,-[facility])     History of Present Illness:  Alexei Lange is a 59 year old male presents for New Patient (New patient-Postherpetic neuralgia-Maria A Chan,-[place]). Patient had a shingles eruption three months ago that resolved within two weeks and he was left with residual severe burning pain that he is unable to tolerate his shirt. He was tried on gabapenting with increasing dose but had no effect on his pain.     Location: left chest wall T4-6 dermatomes  Severity: 9/10  Descriptors: burning   Aggravating Factors: touch  Reliving Factors: none  Associated Symptoms: none    Functional Goals of Treatment: pain relief     Current Medication:    pregabalin 50 MG Oral Cap Take 1 capsule (50 mg total) by mouth 3 (three) times daily. 90 capsule 0    DULoxetine 30 MG Oral Cap DR Particles Take 1 capsule (30 mg total) by mouth daily. 30 capsule 0    lidocaine 5 % External Patch Place 2 patches onto the skin daily. 60 patch 0       Other Medical History  Past Medical History:    Diverticulitis    High cholesterol    Hyperlipidemia    Osteoarthritis    knee     Review of Systems:  CONSTITUTIONAL: denies fevers, chills  HEENT: denies swallowing difficulties, sore throat  CARDIOVASCULAR: denies chest pain, palpitations, syncope  RESPIRATORY: denies shortness of breath, cough, wheezing  GI: denies change in bowel habits, nausea, vomiting  : denies change in bladder function, frequency, dysuria  SKIN: denies rash, skin changes  MSK: Per HPI  NEURO: Per HPI  PSYCH: Per HPI      General Physical Exam:    Constitutional  Oriented to person, place, and time. Appears well-developed and   well-nourished  Head    Normocephalic and atraumatic.  Eyes    Pupils are equal,  round, and reactive to light.  Neck    Neck supple  Cardiovascular  Minimal to no peripheral edema, intact distal pulses  Pulmonary/Chest  Effort normal, no shortness of breath noted  Neurological   Alert and oriented to person, place, and time  Skin    Skin is warm and dry  Psychiatric   Normal mood and affect, behavior and judgment    Neurologic & Musculoskeletal Exam    Cervical    Region Exam Right  (+/-) Left  (+/-)   Cervical Musculature  Tender w/ Palpation - -   Cervical Facets Pain w/ Extension - -   Upper Extremity  Spurling's - -   Upper Extremity Bakody's - -       Sensation Right Left   Neck Normal Normal   C5: Shoulder Normal Normal   C6: Thumb, radial aspect of hand/forearm (Radial Nerve) Normal Normal   C7: Long finger (Median Nerve) Normal Normal   C8: Little finger, ulnar aspect of hand/forearm (Ulnar n.) Normal Normal   T1; Medial forearm/arm Normal Normal         Motor Strength Right Left   C5: Shoulder abduction (Deltoid) 5/5 5/5   C5: Elbow flexion (Biceps, Brachialis) 5/5 5/5   C6: Wrist extension (ECRB, ECRL) 5/5 5/5   C7: Elbow extension (Triceps) 5/5 5/5   C8: Finger flexion ( strength) 5/5 5/5   T1: Finger abduction  5/5 5/5       Reflexes Right Left   C5: Biceps 2/4 2/4   C6: Brachioradialis  2/4 2/4   C7: Triceps 2/4 2/4   Brunson's Absent Absent   Clonus Absent Absent   Chest wall with evidence of residual skin changes from shingles and persistent severe allodynia       Assessment & Plan:  Alexei Lange is a 59 year old male with severe post-herpetic neuralgia   - Failed Gabapentin at 600 mg TID   - Will switch to lyrica 50 mg TID   - Start Duloxetine 30 mg once daily   - High strength Lidocaine patches to involved skin.   - Follow up in 4-6 weeks       Comprehensive analgesic plan was formulated. Conservative vs. Aggressive measures were discussed at length including pharmacotherapy (eg. Anti- inflammatories, muscle relaxants, neuropathic medications, oral steroids, analgesics),  injections, and further testing. Risks and benefits of all options were discussed at length to patients satisfaction during a comprehensive interactive discussion. All questions were answered during extended questions and answer session. Patient agreeable to discussion plan. Greater than 50% of the time was spent with counseling (nature of discussion centered around pain, therapy, and treatment options), face to face time, time spent reviewing data, obtaining patient information and discussing the care with the patients health care providers.     Time spent: 30    Segun Butterfield MD, 09/09/24, 9:38 PM,e

## 2024-09-09 NOTE — PROGRESS NOTES
Patient presents in office today with reported pain in left chest around to his back.     Current pain level reported = 8/10    Last reported dose of Gabapentin 09.07.24       Narcotic Contract renewal New Patient       Shingles in July 2024.  Has tried Gabapentin with no relief     09.09.24-New patient-Postherpetic neuralgia-Maria A Chan,-[facility]

## 2024-09-09 NOTE — TELEPHONE ENCOUNTER
PA request received via fax today     Key: [id number]  For Lidocaine 5% patches.     PA started, unable to submit today d/t not having today's visit signed by provider. Saved progress and will submit once notes are signed since insurance requires documentation.

## 2024-09-09 NOTE — PATIENT INSTRUCTIONS
Refill policies:    Allow 2-3 business days for refills; controlled substances may take longer.  Contact your pharmacy at least 5 days prior to running out of medication and have them send an electronic request or submit request through the “request refill” option in your [employer] account.  Refills are not addressed on weekends; covering physicians do not authorize routine medications on weekends.  No narcotics or controlled substances are refilled after noon on Fridays or by on call physicians.  By law, narcotics must be electronically prescribed.  A 30 day supply with no refills is the maximum allowed.  If your prescription is due for a refill, you may be due for a follow up appointment.  To best provide you care, patients receiving routine medications need to be seen at least once a year.  Patients receiving narcotic/controlled substance medications need to be seen at least once every 3 months.  In the event that your preferred pharmacy does not have the requested medication in stock (e.g. Backordered), it is your responsibility to find another pharmacy that has the requested medication available.  We will gladly send a new prescription to that pharmacy at your request.    Scheduling Tests:    If your physician has ordered radiology tests such as MRI or CT scans, please contact Central Scheduling at [phone] right away to schedule the test.  Once scheduled, the [facility] Centralized Referral Team will work with your insurance carrier to obtain pre-certification or prior authorization.  Depending on your insurance carrier, approval may take 3-10 days.  It is highly recommended patients assure they have received an authorization before having a test performed.  If test is done without insurance authorization, patient may be responsible for the entire amount billed.      Precertification and Prior Authorizations:  If your physician has recommended that you have a procedure or additional testing performed the [facility]  Centralized Referral Team will contact your insurance carrier to obtain pre-certification or prior authorization.    You are strongly encouraged to contact your insurance carrier to verify that your procedure/test has been approved and is a COVERED benefit.  Although the [facility] Centralized Referral Team does its due diligence, the insurance carrier gives the disclaimer that \"Although the procedure is authorized, this does not guarantee payment.\"    Ultimately the patient is responsible for payment.   Thank you for your understanding in this matter.  Paperwork Completion:  If you require FMLA or disability paperwork for your recovery, please make sure to either drop it off or have it faxed to our office at [phone]. Be sure the form has your name and date of birth on it.  The form will be faxed to our Forms Department and they will complete it for you.  There is a 25$ fee for all forms that need to be filled out.  Please be aware there is a 10-14 day turnaround time.  You will need to sign a release of information (RAY) form if your paperwork does not come with one.  You may call the Forms Department with any questions at [phone].  Their fax number is [phone].

## 2024-09-10 NOTE — TELEPHONE ENCOUNTER
PA submitted today:     This request has received an approval. View the bottom of the request for an electronic copy of the approval letter. Will call pharmacy once they are open to let them know insurance approved medication and to fill for pt and call them.

## 2024-09-19 PROCEDURE — 88305 TISSUE EXAM BY PATHOLOGIST: CPT | Performed by: INTERNAL MEDICINE

## 2024-09-30 RX ORDER — DULOXETIN HYDROCHLORIDE 30 MG/1
30 CAPSULE, DELAYED RELEASE ORAL DAILY
Qty: 30 CAPSULE | Refills: 0 | Status: CANCELLED | OUTPATIENT
Start: 2024-10-09 | End: 2024-11-08

## 2024-09-30 RX ORDER — LIDOCAINE 50 MG/G
2 PATCH TOPICAL EVERY 24 HOURS
Qty: 60 PATCH | Refills: 0 | Status: CANCELLED | OUTPATIENT
Start: 2024-10-09 | End: 2024-11-08

## 2024-09-30 RX ORDER — LIDOCAINE 50 MG/G
2 PATCH TOPICAL EVERY 24 HOURS
Qty: 60 PATCH | Refills: 0 | Status: CANCELLED | OUTPATIENT
Start: 2024-11-08 | End: 2024-12-08

## 2024-09-30 RX ORDER — PREGABALIN 50 MG/1
50 CAPSULE ORAL 3 TIMES DAILY
Qty: 90 CAPSULE | Refills: 0 | Status: CANCELLED | OUTPATIENT
Start: 2024-11-08 | End: 2024-12-08

## 2024-09-30 RX ORDER — DULOXETIN HYDROCHLORIDE 30 MG/1
30 CAPSULE, DELAYED RELEASE ORAL DAILY
Qty: 30 CAPSULE | Refills: 0 | Status: CANCELLED | OUTPATIENT
Start: 2024-11-08 | End: 2024-12-08

## 2024-09-30 RX ORDER — PREGABALIN 50 MG/1
50 CAPSULE ORAL 3 TIMES DAILY
Qty: 90 CAPSULE | Refills: 0 | Status: CANCELLED | OUTPATIENT
Start: 2024-10-09 | End: 2024-11-08

## 2024-10-03 ENCOUNTER — OFFICE VISIT (OUTPATIENT)
Dept: PAIN CLINIC | Facility: HOSPITAL | Age: 59
End: 2024-10-03
Attending: ANESTHESIOLOGY
Payer: COMMERCIAL

## 2024-10-03 VITALS
OXYGEN SATURATION: 98 % | DIASTOLIC BLOOD PRESSURE: 78 MMHG | WEIGHT: 250 LBS | SYSTOLIC BLOOD PRESSURE: 143 MMHG | BODY MASS INDEX: 34 KG/M2 | HEART RATE: 63 BPM

## 2024-10-03 DIAGNOSIS — B02.29 HZV (HERPES ZOSTER VIRUS) POST HERPETIC NEURALGIA: ICD-10-CM

## 2024-10-03 PROCEDURE — 99211 OFF/OP EST MAY X REQ PHY/QHP: CPT

## 2024-10-03 RX ORDER — DULOXETIN HYDROCHLORIDE 30 MG/1
30 CAPSULE, DELAYED RELEASE ORAL DAILY
Qty: 30 CAPSULE | Refills: 0 | Status: SHIPPED | OUTPATIENT
Start: 2024-11-09 | End: 2024-12-09

## 2024-10-03 RX ORDER — DULOXETIN HYDROCHLORIDE 30 MG/1
30 CAPSULE, DELAYED RELEASE ORAL DAILY
Qty: 30 CAPSULE | Refills: 0 | Status: SHIPPED | OUTPATIENT
Start: 2024-10-09

## 2024-10-03 RX ORDER — PREGABALIN 50 MG/1
50 CAPSULE ORAL 3 TIMES DAILY
Qty: 90 CAPSULE | Refills: 0 | Status: SHIPPED | OUTPATIENT
Start: 2024-11-08 | End: 2024-12-08

## 2024-10-03 RX ORDER — PREGABALIN 50 MG/1
50 CAPSULE ORAL 3 TIMES DAILY
Qty: 90 CAPSULE | Refills: 0 | Status: SHIPPED | OUTPATIENT
Start: 2024-10-09 | End: 2024-11-08

## 2024-10-03 RX ORDER — LIDOCAINE 50 MG/G
2 PATCH TOPICAL EVERY 24 HOURS
Qty: 60 PATCH | Refills: 0 | Status: SHIPPED | OUTPATIENT
Start: 2024-10-09 | End: 2024-11-08

## 2024-10-03 NOTE — PROGRESS NOTES
Patient presents in office today with reported pain in left chest around to his back.      Current pain level reported = 610     Last reported dose of Pregabalin last night 7pm         Narcotic Contract renewal New Patient         Shingles in July 2024.  Patient states about 40% improvement and is now able to sleep.

## 2024-10-03 NOTE — PATIENT INSTRUCTIONS
Refill policies:    Allow 2-3 business days for refills; controlled substances may take longer.  Contact your pharmacy at least 5 days prior to running out of medication and have them send an electronic request or submit request through the “request refill” option in your [employer] account.  Refills are not addressed on weekends; covering physicians do not authorize routine medications on weekends.  No narcotics or controlled substances are refilled after noon on Fridays or by on call physicians.  By law, narcotics must be electronically prescribed.  A 30 day supply with no refills is the maximum allowed.  If your prescription is due for a refill, you may be due for a follow up appointment.  To best provide you care, patients receiving routine medications need to be seen at least once a year.  Patients receiving narcotic/controlled substance medications need to be seen at least once every 3 months.  In the event that your preferred pharmacy does not have the requested medication in stock (e.g. Backordered), it is your responsibility to find another pharmacy that has the requested medication available.  We will gladly send a new prescription to that pharmacy at your request.    Scheduling Tests:    If your physician has ordered radiology tests such as MRI or CT scans, please contact Central Scheduling at [phone] right away to schedule the test.  Once scheduled, the [facility] Centralized Referral Team will work with your insurance carrier to obtain pre-certification or prior authorization.  Depending on your insurance carrier, approval may take 3-10 days.  It is highly recommended patients assure they have received an authorization before having a test performed.  If test is done without insurance authorization, patient may be responsible for the entire amount billed.      Precertification and Prior Authorizations:  If your physician has recommended that you have a procedure or additional testing performed the [facility]  Centralized Referral Team will contact your insurance carrier to obtain pre-certification or prior authorization.    You are strongly encouraged to contact your insurance carrier to verify that your procedure/test has been approved and is a COVERED benefit.  Although the [facility] Centralized Referral Team does its due diligence, the insurance carrier gives the disclaimer that \"Although the procedure is authorized, this does not guarantee payment.\"    Ultimately the patient is responsible for payment.   Thank you for your understanding in this matter.  Paperwork Completion:  If you require FMLA or disability paperwork for your recovery, please make sure to either drop it off or have it faxed to our office at [phone]. Be sure the form has your name and date of birth on it.  The form will be faxed to our Forms Department and they will complete it for you.  There is a 25$ fee for all forms that need to be filled out.  Please be aware there is a 10-14 day turnaround time.  You will need to sign a release of information (RAY) form if your paperwork does not come with one.  You may call the Forms Department with any questions at [phone].  Their fax number is [phone].

## 2024-10-03 NOTE — CHRONIC PAIN
Interventional Pain Medicine  New Patient Note    Subjective:     Referring Physician: No referring provider defined for this encounter.     Record Review: I personally reviewed IM records    Chief Complaint: Medication Eval     History of Present Illness:  Alexei Lange is a 59 year old male with shingle irruption mid thoracic region on the left side.  He has severe pain and difficulty sleeping prior to seeing the pain clinic he was placed on gabapentin we switch that over to Lyrica as well as Cymbalta and lidocaine patches he is reports about a 40% decrease in his pain he is sleeping better but he still has a fair amount of pain during the day he can any T-shirt or touching will elicit's extreme pain is lesions are healed.    No side effects from the medications we discussed other potential therapeutic options including thoracic epidural sympathetic block    Location: left chest wall T4-6 dermatomes  Severity: 9/10  Descriptors: burning   Aggravating Factors: touch  Reliving Factors: none  Associated Symptoms: none    Functional Goals of Treatment: pain relief     Current Medication:    pregabalin 50 MG Oral Cap Take 1 capsule (50 mg total) by mouth 3 (three) times daily. 90 capsule 0    DULoxetine 30 MG Oral Cap DR Particles Take 1 capsule (30 mg total) by mouth daily. 30 capsule 0    lidocaine 5 % External Patch Place 2 patches onto the skin daily. 60 patch 0    simvastatin 40 MG Oral Tab Take 1 tablet (40 mg total) by mouth nightly.         Other Medical History  Past Medical History:    Diverticulitis    High cholesterol    Hyperlipidemia    Osteoarthritis    knee     Review of Systems:  CONSTITUTIONAL: denies fevers, chills  HEENT: denies swallowing difficulties, sore throat  CARDIOVASCULAR: denies chest pain, palpitations, syncope  RESPIRATORY: denies shortness of breath, cough, wheezing  GI: denies change in bowel habits, nausea, vomiting  : denies change in bladder function, frequency, dysuria  SKIN:  denies rash, skin changes  MSK: Per HPI  NEURO: Per HPI  PSYCH: Per HPI      General Physical Exam:    Constitutional  Oriented to person, place, and time. Appears well-developed and   well-nourished  Head    Normocephalic and atraumatic.  Eyes    Pupils are equal, round, and reactive to light.  Neck    Neck supple  Cardiovascular  Minimal to no peripheral edema, intact distal pulses  Pulmonary/Chest  Effort normal, no shortness of breath noted  Neurological   Alert and oriented to person, place, and time  Skin    Skin is warm and dry  Psychiatric   Normal mood and affect, behavior and judgment    Neurologic & Musculoskeletal Exam    Cervical    Region Exam Right  (+/-) Left  (+/-)   Cervical Musculature  Tender w/ Palpation - -   Cervical Facets Pain w/ Extension - -   Upper Extremity  Spurling's - -   Upper Extremity Bakody's - -       Sensation Right Left   Neck Normal Normal   C5: Shoulder Normal Normal   C6: Thumb, radial aspect of hand/forearm (Radial Nerve) Normal Normal   C7: Long finger (Median Nerve) Normal Normal   C8: Little finger, ulnar aspect of hand/forearm (Ulnar n.) Normal Normal   T1; Medial forearm/arm Normal Normal         Motor Strength Right Left   C5: Shoulder abduction (Deltoid) 5/5 5/5   C5: Elbow flexion (Biceps, Brachialis) 5/5 5/5   C6: Wrist extension (ECRB, ECRL) 5/5 5/5   C7: Elbow extension (Triceps) 5/5 5/5   C8: Finger flexion ( strength) 5/5 5/5   T1: Finger abduction  5/5 5/5       Reflexes Right Left   C5: Biceps 2/4 2/4   C6: Brachioradialis  2/4 2/4   C7: Triceps 2/4 2/4   Brunson's Absent Absent   Clonus Absent Absent   Chest wall with evidence of residual skin changes from shingles and persistent severe allodynia       Assessment & Plan:  Alexei Lange is a 59 year old male with severe post-herpetic neuralgia   And about 40% improvement since he started the Lyrica  He continues with the Cymbalta as well as the lidocaine patches    Discussed other therapeutic options he  wants to try the thoracic epidural sympathetic block    Will wait for insurance approval and get him set up for that and he can continue his current medications      Comprehensive analgesic plan was formulated. Conservative vs. Aggressive measures were discussed at length including pharmacotherapy (eg. Anti- inflammatories, muscle relaxants, neuropathic medications, oral steroids, analgesics), injections, and further testing. Risks and benefits of all options were discussed at length to patients satisfaction during a comprehensive interactive discussion. All questions were answered during extended questions and answer session. Patient agreeable to discussion plan. Greater than 50% of the time was spent with counseling (nature of discussion centered around pain, therapy, and treatment options), face to face time, time spent reviewing data, obtaining patient information and discussing the care with the patients health care providers.     Time spent: 30

## 2024-10-04 ENCOUNTER — TELEPHONE (OUTPATIENT)
Dept: PAIN CLINIC | Facility: HOSPITAL | Age: 59
End: 2024-10-04

## 2024-10-04 DIAGNOSIS — B02.29 HZV (HERPES ZOSTER VIRUS) POST HERPETIC NEURALGIA: Primary | ICD-10-CM

## 2024-10-04 NOTE — TELEPHONE ENCOUNTER
Prior authorization request completed for: Thoracic Epidural Sympathetic Block   Authorization #No Prior Authorization Required for Pain Management Injections -unless it is synvisc/orthovisc injections.   Pre-D: Not Required.   Exclusions/Restrictions: -based on medical necessity.   Covered Benefit:  -based on medical necessity.   Authorization dates: n/a  CPT codes approved: 70797  Number of visits/dates of service approved: 1  Physician: Rowena   Location: [facility]    Call Ref#: [id number]  Representative Name: Nichelle   Insurance Carrier: [employer] (phone #:[phone])   Total Call Time : 10:14     Patient can be scheduled. Routed to Navigator.

## 2024-10-21 ENCOUNTER — TELEPHONE (OUTPATIENT)
Facility: CLINIC | Age: 59
End: 2024-10-21

## 2024-10-21 NOTE — TELEPHONE ENCOUNTER
----- Message from Misha Benítez sent at 10/20/2024 11:44 AM CDT -----  GI RNs - please recall for colonoscopy exam in 7yrs

## 2024-10-21 NOTE — TELEPHONE ENCOUNTER
Health Maintenance Updated.    7 year colonoscopy recall entered into patient outreach in [facility].  Next colonoscopy will be due 9/19/2031.

## 2024-11-21 ENCOUNTER — OFFICE VISIT (OUTPATIENT)
Dept: PAIN CLINIC | Facility: HOSPITAL | Age: 59
End: 2024-11-21
Attending: ANESTHESIOLOGY
Payer: COMMERCIAL

## 2024-11-21 VITALS
DIASTOLIC BLOOD PRESSURE: 75 MMHG | SYSTOLIC BLOOD PRESSURE: 103 MMHG | OXYGEN SATURATION: 98 % | HEART RATE: 91 BPM | WEIGHT: 250 LBS | BODY MASS INDEX: 34 KG/M2

## 2024-11-21 DIAGNOSIS — B02.29 HZV (HERPES ZOSTER VIRUS) POST HERPETIC NEURALGIA: ICD-10-CM

## 2024-11-21 PROCEDURE — 99214 OFFICE O/P EST MOD 30 MIN: CPT | Performed by: ANESTHESIOLOGY

## 2024-11-21 RX ORDER — LIDOCAINE 50 MG/G
2 PATCH TOPICAL EVERY 24 HOURS
Qty: 180 PATCH | Refills: 0 | Status: SHIPPED | OUTPATIENT
Start: 2024-11-21 | End: 2025-02-19

## 2024-11-21 RX ORDER — DULOXETIN HYDROCHLORIDE 30 MG/1
30 CAPSULE, DELAYED RELEASE ORAL DAILY
Qty: 90 CAPSULE | Refills: 0 | Status: SHIPPED | OUTPATIENT
Start: 2024-11-21 | End: 2025-02-19

## 2024-11-21 RX ORDER — PREGABALIN 50 MG/1
50 CAPSULE ORAL 3 TIMES DAILY
Qty: 90 CAPSULE | Refills: 2 | Status: SHIPPED | OUTPATIENT
Start: 2024-11-21 | End: 2025-02-19

## 2024-11-21 NOTE — PATIENT INSTRUCTIONS
Refill policies:    Allow 2-3 business days for refills; controlled substances may take longer.  Contact your pharmacy at least 5 days prior to running out of medication and have them send an electronic request or submit request through the “request refill” option in your [employer] account.  Refills are not addressed on weekends; covering physicians do not authorize routine medications on weekends.  No narcotics or controlled substances are refilled after noon on Fridays or by on call physicians.  By law, narcotics must be electronically prescribed.  A 30 day supply with no refills is the maximum allowed.  If your prescription is due for a refill, you may be due for a follow up appointment.  To best provide you care, patients receiving routine medications need to be seen at least once a year.  Patients receiving narcotic/controlled substance medications need to be seen at least once every 3 months.  In the event that your preferred pharmacy does not have the requested medication in stock (e.g. Backordered), it is your responsibility to find another pharmacy that has the requested medication available.  We will gladly send a new prescription to that pharmacy at your request.    Scheduling Tests:    If your physician has ordered radiology tests such as MRI or CT scans, please contact Central Scheduling at [phone] right away to schedule the test.  Once scheduled, the [facility] Centralized Referral Team will work with your insurance carrier to obtain pre-certification or prior authorization.  Depending on your insurance carrier, approval may take 3-10 days.  It is highly recommended patients assure they have received an authorization before having a test performed.  If test is done without insurance authorization, patient may be responsible for the entire amount billed.      Precertification and Prior Authorizations:  If your physician has recommended that you have a procedure or additional testing performed the [facility]  Centralized Referral Team will contact your insurance carrier to obtain pre-certification or prior authorization.    You are strongly encouraged to contact your insurance carrier to verify that your procedure/test has been approved and is a COVERED benefit.  Although the [facility] Centralized Referral Team does its due diligence, the insurance carrier gives the disclaimer that \"Although the procedure is authorized, this does not guarantee payment.\"    Ultimately the patient is responsible for payment.   Thank you for your understanding in this matter.  Paperwork Completion:  If you require FMLA or disability paperwork for your recovery, please make sure to either drop it off or have it faxed to our office at [phone]. Be sure the form has your name and date of birth on it.  The form will be faxed to our Forms Department and they will complete it for you.  There is a 25$ fee for all forms that need to be filled out.  Please be aware there is a 10-14 day turnaround time.  You will need to sign a release of information (RAY) form if your paperwork does not come with one.  You may call the Forms Department with any questions at [phone].  Their fax number is [phone].

## 2024-11-21 NOTE — PROGRESS NOTES
[facility]24[facility]    Percentage of relief obtained: none    Duration of relief: NA    Current Pain Score: 9/10    Narcotic Contract Exp: NA

## 2024-11-21 NOTE — CHRONIC PAIN
Interventional Pain Medicine  New Patient Note    Subjective:     Referring Physician: No referring provider defined for this encounter.     Record Review: I personally reviewed IM records    Chief Complaint: Follow - Up (10.21.24-THORACIC EPIDURAL SYMPATHETIC NERVE BLOCK-Rowena)     History of Present Illness:  Alexei Lange is a 59 year old male with shingle irruption mid thoracic region on the left side.  He has severe pain and difficulty sleeping prior to seeing the pain clinic he was placed on gabapentin we switch that over to Lyrica as well as Cymbalta and lidocaine patches he is reports about a 40% decrease in his pain he is sleeping better but he still has a fair amount of pain during the day he can any T-shirt or touching will elicit's extreme pain is lesions are healed.    No side effects from the medications     Patient underwent a thoracic epidural sympathetic block last month unfortunately he only received 2 hours of pain relief from the local anesthetic and no long-lasting or subsequent decrease in pain from the thoracic epidural sympathetic block    Location: left chest wall T4-6 dermatomes  Severity: 9/10  Descriptors: burning   Aggravating Factors: touch  Reliving Factors: none  Associated Symptoms: none    Functional Goals of Treatment: pain relief     Current Medication:    pregabalin 50 MG Oral Cap Take 1 capsule (50 mg total) by mouth 3 (three) times daily. 90 capsule 2    DULoxetine 30 MG Oral Cap DR Particles Take 1 capsule (30 mg total) by mouth daily. 90 capsule 0    lidocaine 5 % External Patch Place 2 patches onto the skin daily. 180 patch 0    DULoxetine 30 MG Oral Cap DR Particles Take 1 capsule (30 mg total) by mouth daily. 30 capsule 0    simvastatin 40 MG Oral Tab Take 1 tablet (40 mg total) by mouth nightly.      HYDROcodone-acetaminophen 5-325 MG Oral Tab Take 1-2 tablets by mouth every 6 (six) hours as needed. 20 tablet 0       Other Medical History  Past Medical History:     Diverticulitis    High cholesterol    Hyperlipidemia    Osteoarthritis    knee     Review of Systems:  CONSTITUTIONAL: denies fevers, chills  HEENT: denies swallowing difficulties, sore throat  CARDIOVASCULAR: denies chest pain, palpitations, syncope  RESPIRATORY: denies shortness of breath, cough, wheezing  GI: denies change in bowel habits, nausea, vomiting  : denies change in bladder function, frequency, dysuria  SKIN: denies rash, skin changes  MSK: Per HPI  NEURO: Per HPI  PSYCH: Per HPI      General Physical Exam:    Constitutional  Oriented to person, place, and time. Appears well-developed and   well-nourished  Head    Normocephalic and atraumatic.  Eyes    Pupils are equal, round, and reactive to light.  Neck    Neck supple  Cardiovascular  Minimal to no peripheral edema, intact distal pulses  Pulmonary/Chest  Effort normal, no shortness of breath noted  Neurological   Alert and oriented to person, place, and time  Skin    Skin is warm and dry  Psychiatric   Normal mood and affect, behavior and judgment    Neurologic & Musculoskeletal Exam    Cervical    Region Exam Right  (+/-) Left  (+/-)   Cervical Musculature  Tender w/ Palpation - -   Cervical Facets Pain w/ Extension - -   Upper Extremity  Spurling's - -   Upper Extremity Bakody's - -       Sensation Right Left   Neck Normal Normal   C5: Shoulder Normal Normal   C6: Thumb, radial aspect of hand/forearm (Radial Nerve) Normal Normal   C7: Long finger (Median Nerve) Normal Normal   C8: Little finger, ulnar aspect of hand/forearm (Ulnar n.) Normal Normal   T1; Medial forearm/arm Normal Normal         Motor Strength Right Left   C5: Shoulder abduction (Deltoid) 5/5 5/5   C5: Elbow flexion (Biceps, Brachialis) 5/5 5/5   C6: Wrist extension (ECRB, ECRL) 5/5 5/5   C7: Elbow extension (Triceps) 5/5 5/5   C8: Finger flexion ( strength) 5/5 5/5   T1: Finger abduction  5/5 5/5       Reflexes Right Left   C5: Biceps 2/4 2/4   C6: Brachioradialis  2/4 2/4    C7: Triceps 2/4 2/4   Brunson's Absent Absent   Clonus Absent Absent   Chest wall with evidence of residual skin changes from shingles and persistent severe allodynia       Assessment & Plan:  Alexei Lange is a 59 year old male with severe post-herpetic neuralgia   And about 40% improvement since he started the Lyrica Cymbalta and lidocaine patches    Only temporary relief with the thoracic epidural sympathetic block    Gave him the option of repeating the block but he does not want to pursue that at this time    Will renew the medications and also instructed patient to get some capsaicin cream    Follow-up 2 to 3 months      Comprehensive analgesic plan was formulated. Conservative vs. Aggressive measures were discussed at length including pharmacotherapy (eg. Anti- inflammatories, muscle relaxants, neuropathic medications, oral steroids, analgesics), injections, and further testing. Risks and benefits of all options were discussed at length to patients satisfaction during a comprehensive interactive discussion. All questions were answered during extended questions and answer session. Patient agreeable to discussion plan. Greater than 50% of the time was spent with counseling (nature of discussion centered around pain, therapy, and treatment options), face to face time, time spent reviewing data, obtaining patient information and discussing the care with the patients health care providers.     Time spent: 30

## 2025-03-28 ENCOUNTER — OFFICE VISIT (OUTPATIENT)
Dept: PAIN CLINIC | Facility: HOSPITAL | Age: 60
End: 2025-03-28
Attending: ANESTHESIOLOGY
Payer: COMMERCIAL

## 2025-03-28 VITALS
WEIGHT: 250 LBS | BODY MASS INDEX: 34 KG/M2 | DIASTOLIC BLOOD PRESSURE: 58 MMHG | SYSTOLIC BLOOD PRESSURE: 127 MMHG | OXYGEN SATURATION: 97 % | HEART RATE: 69 BPM

## 2025-03-28 DIAGNOSIS — B02.29 HZV (HERPES ZOSTER VIRUS) POST HERPETIC NEURALGIA: Primary | ICD-10-CM

## 2025-03-28 PROCEDURE — 99214 OFFICE O/P EST MOD 30 MIN: CPT | Performed by: ANESTHESIOLOGY

## 2025-03-28 RX ORDER — LISINOPRIL 10 MG/1
TABLET ORAL
COMMUNITY
Start: 2025-02-01

## 2025-03-28 RX ORDER — PREGABALIN 75 MG/1
75 CAPSULE ORAL 3 TIMES DAILY
Qty: 90 CAPSULE | Refills: 1 | Status: SHIPPED | OUTPATIENT
Start: 2025-03-28 | End: 2025-05-27

## 2025-03-28 RX ORDER — HYDROCODONE BITARTRATE AND ACETAMINOPHEN 5; 325 MG/1; MG/1
1 TABLET ORAL EVERY 8 HOURS PRN
Qty: 90 TABLET | Refills: 0 | Status: SHIPPED | OUTPATIENT
Start: 2025-03-28 | End: 2025-04-27

## 2025-03-28 NOTE — PROGRESS NOTES
Patient presents in office today with reported pain in left chest around to his back.      Current pain level reported = 7/10     Last reported dose of Pregabalin last night        Narcotic Contract renewal New Patient     Increases pain would like to discuss POC. \"its a constant thing, 24/7\"        Shingles in July 2024.

## 2025-03-28 NOTE — PATIENT INSTRUCTIONS
Refill policies:    Allow 2-3 business days for refills; controlled substances may take longer.  Contact your pharmacy at least 5 days prior to running out of medication and have them send an electronic request or submit request through the “request refill” option in your [employer] account.  Refills are not addressed on weekends; covering physicians do not authorize routine medications on weekends.  No narcotics or controlled substances are refilled after noon on Fridays or by on call physicians.  By law, narcotics must be electronically prescribed.  A 30 day supply with no refills is the maximum allowed.  If your prescription is due for a refill, you may be due for a follow up appointment.  To best provide you care, patients receiving routine medications need to be seen at least once a year.  Patients receiving narcotic/controlled substance medications need to be seen at least once every 3 months.  In the event that your preferred pharmacy does not have the requested medication in stock (e.g. Backordered), it is your responsibility to find another pharmacy that has the requested medication available.  We will gladly send a new prescription to that pharmacy at your request.    Scheduling Tests:    If your physician has ordered radiology tests such as MRI or CT scans, please contact Central Scheduling at [phone] right away to schedule the test.  Once scheduled, the [facility] Centralized Referral Team will work with your insurance carrier to obtain pre-certification or prior authorization.  Depending on your insurance carrier, approval may take 3-10 days.  It is highly recommended patients assure they have received an authorization before having a test performed.  If test is done without insurance authorization, patient may be responsible for the entire amount billed.      Precertification and Prior Authorizations:  If your physician has recommended that you have a procedure or additional testing performed the [facility]  Centralized Referral Team will contact your insurance carrier to obtain pre-certification or prior authorization.    You are strongly encouraged to contact your insurance carrier to verify that your procedure/test has been approved and is a COVERED benefit.  Although the [facility] Centralized Referral Team does its due diligence, the insurance carrier gives the disclaimer that \"Although the procedure is authorized, this does not guarantee payment.\"    Ultimately the patient is responsible for payment.   Thank you for your understanding in this matter.  Paperwork Completion:  If you require FMLA or disability paperwork for your recovery, please make sure to either drop it off or have it faxed to our office at [phone]. Be sure the form has your name and date of birth on it.  The form will be faxed to our Forms Department and they will complete it for you.  There is a 25$ fee for all forms that need to be filled out.  Please be aware there is a 10-14 day turnaround time.  You will need to sign a release of information (RAY) form if your paperwork does not come with one.  You may call the Forms Department with any questions at [phone].  Their fax number is [phone].

## 2025-03-28 NOTE — CHRONIC PAIN
Interventional Pain Medicine  New Patient Note    Subjective:     Referring Physician: No referring provider defined for this encounter.         Chief Complaint: Medication Eval for postherpetic neuralgia status post thoracic epidural sympathetic block and medication management    History of Present Illness:  Alexei Lange is a 59 year old male with shingle irruption mid thoracic region on the left side.  He has severe pain and difficulty sleeping prior to seeing the pain clinic he was placed on gabapentin we switch that over to Lyrica as well as Cymbalta and lidocaine patches he is reports about a 40% decrease in his pain he is sleeping better but he still has a fair amount of pain during the day he can any T-shirt or touching will elicit's extreme pain is lesions are healed.    No side effects from the medications     Patient underwent a thoracic epidural sympathetic block last month unfortunately he only received 2 hours of pain relief from the local anesthetic and no long-lasting or subsequent decrease in pain from the thoracic epidural sympathetic block    The postherpetic neuralgia pain has been present now for 10 months    Location: left chest wall T4-6 dermatomes  Severity: 9/10  Descriptors: burning   Aggravating Factors: touch  Reliving Factors: none  Associated Symptoms: none    Functional Goals of Treatment: pain relief     Current Medication:    lisinopril 10 MG Oral Tab       pregabalin 75 MG Oral Cap Take 1 capsule (75 mg total) by mouth 3 (three) times daily. 90 capsule 1    HYDROcodone-acetaminophen 5-325 MG Oral Tab Take 1 tablet by mouth every 8 (eight) hours as needed for Pain. 90 tablet 0    DULoxetine 30 MG Oral Cap DR Particles Take 1 capsule (30 mg total) by mouth daily. 30 capsule 0    simvastatin 40 MG Oral Tab Take 1 tablet (40 mg total) by mouth nightly.      valACYclovir 1 G Oral Tab Take 1 tablet (1,000 mg total) by mouth 3 (three) times daily.         Other Medical History  Past  Medical History:    Diverticulitis    High cholesterol    Hyperlipidemia    Osteoarthritis    knee     Review of Systems:  CONSTITUTIONAL: denies fevers, chills  HEENT: denies swallowing difficulties, sore throat  CARDIOVASCULAR: denies chest pain, palpitations, syncope  RESPIRATORY: denies shortness of breath, cough, wheezing  GI: denies change in bowel habits, nausea, vomiting  : denies change in bladder function, frequency, dysuria  SKIN: denies rash, skin changes  MSK: Per HPI  NEURO: Per HPI  PSYCH: Per HPI      General Physical Exam:    Constitutional  Oriented to person, place, and time. Appears well-developed and   well-nourished  Head    Normocephalic and atraumatic.  Eyes    Pupils are equal, round, and reactive to light.  Neck    Neck supple  Cardiovascular  Minimal to no peripheral edema, intact distal pulses  Pulmonary/Chest  Effort normal, no shortness of breath noted  Neurological   Alert and oriented to person, place, and time  Skin    Skin is warm and dry  Psychiatric   Normal mood and affect, behavior and judgment    Neurologic & Musculoskeletal Exam    Cervical    Region Exam Right  (+/-) Left  (+/-)   Cervical Musculature  Tender w/ Palpation - -   Cervical Facets Pain w/ Extension - -   Upper Extremity  Spurling's - -   Upper Extremity Bakody's - -       Sensation Right Left   Neck Normal Normal   C5: Shoulder Normal Normal   C6: Thumb, radial aspect of hand/forearm (Radial Nerve) Normal Normal   C7: Long finger (Median Nerve) Normal Normal   C8: Little finger, ulnar aspect of hand/forearm (Ulnar n.) Normal Normal   T1; Medial forearm/arm Normal Normal         Motor Strength Right Left   C5: Shoulder abduction (Deltoid) 5/5 5/5   C5: Elbow flexion (Biceps, Brachialis) 5/5 5/5   C6: Wrist extension (ECRB, ECRL) 5/5 5/5   C7: Elbow extension (Triceps) 5/5 5/5   C8: Finger flexion ( strength) 5/5 5/5   T1: Finger abduction  5/5 5/5       Reflexes Right Left   C5: Biceps 2/4 2/4   C6:  Brachioradialis  2/4 2/4   C7: Triceps 2/4 2/4   Brunson's Absent Absent   Clonus Absent Absent   Chest wall with evidence of residual skin changes from shingles and persistent severe allodynia       Assessment & Plan:  Alexei Lange is a 59 year old male with severe post-herpetic neuralgia   And about 40% improvement since he started the Lyrica Cymbalta and lidocaine patches    Only temporary relief with the thoracic epidural sympathetic block    Gave him the option of repeating the block but he does not want to pursue that at this time    Will increase the Lyrica to 75 mg 3 times a day    Also give him a prescription for Norco 5 mg  to take intermittently if the pain is out of control    Follow-up 2 to 3 months      Comprehensive analgesic plan was formulated. Conservative vs. Aggressive measures were discussed at length including pharmacotherapy (eg. Anti- inflammatories, muscle relaxants, neuropathic medications, oral steroids, analgesics), injections, and further testing. Risks and benefits of all options were discussed at length to patients satisfaction during a comprehensive interactive discussion. All questions were answered during extended questions and answer session. Patient agreeable to discussion plan. Greater than 50% of the time was spent with counseling (nature of discussion centered around pain, therapy, and treatment options), face to face time, time spent reviewing data, obtaining patient information and discussing the care with the patients health care providers.     Time spent: 30      Alan Guaman MD

## 2025-05-01 ENCOUNTER — OFFICE VISIT (OUTPATIENT)
Dept: PAIN CLINIC | Facility: HOSPITAL | Age: 60
End: 2025-05-01
Attending: ANESTHESIOLOGY
Payer: COMMERCIAL

## 2025-05-01 VITALS
SYSTOLIC BLOOD PRESSURE: 127 MMHG | BODY MASS INDEX: 34 KG/M2 | HEART RATE: 79 BPM | DIASTOLIC BLOOD PRESSURE: 75 MMHG | WEIGHT: 250 LBS | OXYGEN SATURATION: 97 %

## 2025-05-01 DIAGNOSIS — B02.29 HZV (HERPES ZOSTER VIRUS) POST HERPETIC NEURALGIA: Primary | ICD-10-CM

## 2025-05-01 PROCEDURE — 99214 OFFICE O/P EST MOD 30 MIN: CPT | Performed by: ANESTHESIOLOGY

## 2025-05-01 RX ORDER — PREGABALIN 75 MG/1
75 CAPSULE ORAL EVERY 6 HOURS
Qty: 120 CAPSULE | Refills: 1 | Status: SHIPPED | OUTPATIENT
Start: 2025-05-01 | End: 2025-06-30

## 2025-05-01 NOTE — PROGRESS NOTES
Patient presents in office today with reported pain in left chest around to his back.      Current pain level reported = 8/10     Last reported dose of Pregabalin and Duloxetine this morning         Narcotic Contract renewal New Patient      Increased pain would like to discuss POC. \"its a constant thing, 24/7\"        Domi DX July 2024.

## 2025-05-01 NOTE — CHRONIC PAIN
Interventional Pain Medicine  New Patient Note    Subjective:     Referring Physician: No referring provider defined for this encounter.         Chief Complaint: Medication Eval for postherpetic neuralgia status post thoracic epidural sympathetic block and medication management    History of Present Illness:  Alexei Lange is a 60 year old male with shingle irruption mid thoracic region on the left side.  Now it has been over a 1 year since he has had the initial shingles and the pain continues to be severe he has severe pain and difficulty sleeping prior to seeing the pain clinic he was placed on gabapentin we switch that over to Lyrica as well as Cymbalta and lidocaine patches he is reports about a 40% decrease in his pain he is sleeping better but he still has a fair amount of pain during the day he can any T-shirt or touching will elicit's extreme pain is lesions are healed.    No side effects from the medications     Patient underwent a thoracic epidural sympathetic block last month unfortunately he only received 2 hours of pain relief from the local anesthetic and no long-lasting or subsequent decrease in pain from the thoracic epidural sympathetic block    The postherpetic neuralgia pain has been present now for 12 months he continues to have a severe case of the postherpetic neuralgia displaying allodynia we have gradually increased his Lyrica up to 225 mg a day with only minor improvements in the intensity of the pain    Location: left chest wall T4-6 dermatomes  Severity: 9/10  Descriptors: burning   Aggravating Factors: touch  Reliving Factors: none  Associated Symptoms: none    Functional Goals of Treatment: pain relief     Current Medication:    lisinopril 10 MG Oral Tab       pregabalin 75 MG Oral Cap Take 1 capsule (75 mg total) by mouth 3 (three) times daily. 90 capsule 1    DULoxetine 30 MG Oral Cap DR Particles Take 1 capsule (30 mg total) by mouth daily. 30 capsule 0    simvastatin 40 MG Oral  Tab Take 1 tablet (40 mg total) by mouth nightly.         Other Medical History  Past Medical History:    Diverticulitis    High cholesterol    Hyperlipidemia    Osteoarthritis    knee     Review of Systems:  CONSTITUTIONAL: denies fevers, chills  HEENT: denies swallowing difficulties, sore throat  CARDIOVASCULAR: denies chest pain, palpitations, syncope  RESPIRATORY: denies shortness of breath, cough, wheezing  GI: denies change in bowel habits, nausea, vomiting  : denies change in bladder function, frequency, dysuria  SKIN: denies rash, skin changes  MSK: Per HPI  NEURO: Per HPI  PSYCH: Per HPI      General Physical Exam:    Constitutional  Oriented to person, place, and time. Appears well-developed and   well-nourished  Head    Normocephalic and atraumatic.  Eyes    Pupils are equal, round, and reactive to light.  Neck    Neck supple  Cardiovascular  Minimal to no peripheral edema, intact distal pulses  Pulmonary/Chest  Effort normal, no shortness of breath noted  Neurological   Alert and oriented to person, place, and time  Skin    Skin is warm and dry  Psychiatric   Normal mood and affect, behavior and judgment  Thoracic dermatomal area where the pain is at reveals no obvious lesions  Neurologic & Musculoskeletal Exam    Cervical    Region Exam Right  (+/-) Left  (+/-)   Cervical Musculature  Tender w/ Palpation - -   Cervical Facets Pain w/ Extension - -   Upper Extremity  Spurling's - -   Upper Extremity Bakody's - -       Sensation Right Left   Neck Normal Normal   C5: Shoulder Normal Normal   C6: Thumb, radial aspect of hand/forearm (Radial Nerve) Normal Normal   C7: Long finger (Median Nerve) Normal Normal   C8: Little finger, ulnar aspect of hand/forearm (Ulnar n.) Normal Normal   T1; Medial forearm/arm Normal Normal         Motor Strength Right Left   C5: Shoulder abduction (Deltoid) 5/5 5/5   C5: Elbow flexion (Biceps, Brachialis) 5/5 5/5   C6: Wrist extension (ECRB, ECRL) 5/5 5/5   C7: Elbow  extension (Triceps) 5/5 5/5   C8: Finger flexion ( strength) 5/5 5/5   T1: Finger abduction  5/5 5/5       Reflexes Right Left   C5: Biceps 2/4 2/4   C6: Brachioradialis  2/4 2/4   C7: Triceps 2/4 2/4   Brunson's Absent Absent   Clonus Absent Absent   Chest wall with evidence of residual skin changes from shingles and persistent severe allodynia       Assessment & Plan:  Alexei Lange is a 60 year old male with severe post-herpetic neuralgia left-sided dermatome and around T6-8  And about 40% improvement since he started the Lyrica Cymbalta and lidocaine patches    Only temporary relief with the thoracic epidural sympathetic block    Gave him the option of repeating the block but he does not want to pursue that at this time    Will increase the Lyrica to 75 mg 4 times a day  (300 mg per day)    He may benefit from a trial of neuro stim if this continues to be a problem as it has been over a year and he still in pretty severe pain    Follow-up 2 to 3 months      Comprehensive analgesic plan was formulated. Conservative vs. Aggressive measures were discussed at length including pharmacotherapy (eg. Anti- inflammatories, muscle relaxants, neuropathic medications, oral steroids, analgesics), injections, and further testing. Risks and benefits of all options were discussed at length to patients satisfaction during a comprehensive interactive discussion. All questions were answered during extended questions and answer session. Patient agreeable to discussion plan. Greater than 50% of the time was spent with counseling (nature of discussion centered around pain, therapy, and treatment options), face to face time, time spent reviewing data, obtaining patient information and discussing the care with the patients health care providers.     Time spent: 30      Alan Guaman MD

## 2025-05-01 NOTE — PATIENT INSTRUCTIONS
Refill policies:    Allow 2-3 business days for refills; controlled substances may take longer.  Contact your pharmacy at least 5 days prior to running out of medication and have them send an electronic request or submit request through the “request refill” option in your [employer] account.  Refills are not addressed on weekends; covering physicians do not authorize routine medications on weekends.  No narcotics or controlled substances are refilled after noon on Fridays or by on call physicians.  By law, narcotics must be electronically prescribed.  A 30 day supply with no refills is the maximum allowed.  If your prescription is due for a refill, you may be due for a follow up appointment.  To best provide you care, patients receiving routine medications need to be seen at least once a year.  Patients receiving narcotic/controlled substance medications need to be seen at least once every 3 months.  In the event that your preferred pharmacy does not have the requested medication in stock (e.g. Backordered), it is your responsibility to find another pharmacy that has the requested medication available.  We will gladly send a new prescription to that pharmacy at your request.    Scheduling Tests:    If your physician has ordered radiology tests such as MRI or CT scans, please contact Central Scheduling at [phone] right away to schedule the test.  Once scheduled, the [facility] Centralized Referral Team will work with your insurance carrier to obtain pre-certification or prior authorization.  Depending on your insurance carrier, approval may take 3-10 days.  It is highly recommended patients assure they have received an authorization before having a test performed.  If test is done without insurance authorization, patient may be responsible for the entire amount billed.      Precertification and Prior Authorizations:  If your physician has recommended that you have a procedure or additional testing performed the [facility]  Centralized Referral Team will contact your insurance carrier to obtain pre-certification or prior authorization.    You are strongly encouraged to contact your insurance carrier to verify that your procedure/test has been approved and is a COVERED benefit.  Although the [facility] Centralized Referral Team does its due diligence, the insurance carrier gives the disclaimer that \"Although the procedure is authorized, this does not guarantee payment.\"    Ultimately the patient is responsible for payment.   Thank you for your understanding in this matter.  Paperwork Completion:  If you require FMLA or disability paperwork for your recovery, please make sure to either drop it off or have it faxed to our office at [phone]. Be sure the form has your name and date of birth on it.  The form will be faxed to our Forms Department and they will complete it for you.  There is a 25$ fee for all forms that need to be filled out.  Please be aware there is a 10-14 day turnaround time.  You will need to sign a release of information (RAY) form if your paperwork does not come with one.  You may call the Forms Department with any questions at [phone].  Their fax number is [phone].

## (undated) NOTE — ED AVS SNAPSHOT
Bereket Gil   MRN: [id number]    Department:  [facility] Emergency Department   Date of Visit:  1/22/2018           Disclosure     Insurance plans vary and the physician(s) referred by the ER may not be covered by your plan.  Please contact your CARE PHYSICIAN AT ONCE OR RETURN IMMEDIATELY TO THE EMERGENCY DEPARTMENT. If you have been prescribed any medication(s), please fill your prescription right away and begin taking the medication(s) as directed.   If you believe that any of the medications

## (undated) NOTE — LETTER
Date & Time: 3/7/2022, 7:50 AM  Patient: Adriana Healy  Encounter Provider(s):    Lidia Lopez MD       To Whom It May Concern:    Adriana Healy was seen and treated in our department on 3/7/2022. He should not return to work until 3/8/2022.     If you have any questions or concerns, please do not hesitate to call.        _____________________________  Physician/APC Signature

## (undated) NOTE — LETTER
January 22, 2018    Patient: Robert Worley   Date of Visit: 1/22/2018       To Whom It May Concern:    Robert Worley was seen and treated in our emergency department on 1/22/2018. He May need to take a few days off until he feels better. .    If you have an